# Patient Record
Sex: MALE | Race: WHITE | NOT HISPANIC OR LATINO | Employment: UNEMPLOYED | URBAN - METROPOLITAN AREA
[De-identification: names, ages, dates, MRNs, and addresses within clinical notes are randomized per-mention and may not be internally consistent; named-entity substitution may affect disease eponyms.]

---

## 2025-02-12 ENCOUNTER — APPOINTMENT (OUTPATIENT)
Dept: CARDIOLOGY | Facility: HOSPITAL | Age: 60
End: 2025-02-12
Payer: COMMERCIAL

## 2025-02-12 ENCOUNTER — APPOINTMENT (OUTPATIENT)
Dept: RADIOLOGY | Facility: HOSPITAL | Age: 60
End: 2025-02-12
Payer: COMMERCIAL

## 2025-02-12 ENCOUNTER — HOSPITAL ENCOUNTER (INPATIENT)
Facility: HOSPITAL | Age: 60
End: 2025-02-12
Attending: EMERGENCY MEDICINE | Admitting: INTERNAL MEDICINE
Payer: COMMERCIAL

## 2025-02-12 DIAGNOSIS — Z51.5 ADMISSION FOR HOSPICE CARE: ICD-10-CM

## 2025-02-12 DIAGNOSIS — J18.9 PNEUMONIA OF BOTH LUNGS DUE TO INFECTIOUS ORGANISM, UNSPECIFIED PART OF LUNG: Primary | ICD-10-CM

## 2025-02-12 PROBLEM — R91.8 LUNG NODULES: Status: ACTIVE | Noted: 2019-08-20

## 2025-02-12 PROBLEM — I10 ESSENTIAL HYPERTENSION: Status: ACTIVE | Noted: 2020-12-05

## 2025-02-12 PROBLEM — E05.90 HYPERTHYROIDISM: Status: ACTIVE | Noted: 2018-03-17

## 2025-02-12 PROBLEM — F19.90 IVDU (INTRAVENOUS DRUG USER): Status: ACTIVE | Noted: 2019-08-20

## 2025-02-12 PROBLEM — R56.9 SEIZURE (MULTI): Status: ACTIVE | Noted: 2019-03-25

## 2025-02-12 PROBLEM — I50.22 CHRONIC HFREF (HEART FAILURE WITH REDUCED EJECTION FRACTION): Status: ACTIVE | Noted: 2022-09-14

## 2025-02-12 PROBLEM — F41.1 GAD (GENERALIZED ANXIETY DISORDER): Status: ACTIVE | Noted: 2022-09-26

## 2025-02-12 PROBLEM — C64.1 RENAL CELL CARCINOMA OF RIGHT KIDNEY (MULTI): Status: ACTIVE | Noted: 2019-08-27

## 2025-02-12 PROBLEM — I51.81 TAKOTSUBO CARDIOMYOPATHY: Status: ACTIVE | Noted: 2019-08-19

## 2025-02-12 PROBLEM — I25.10 CORONARY ARTERY DISEASE INVOLVING NATIVE CORONARY ARTERY OF NATIVE HEART WITHOUT ANGINA PECTORIS: Status: ACTIVE | Noted: 2020-12-05

## 2025-02-12 PROBLEM — F11.90 CHRONIC, CONTINUOUS USE OF OPIOIDS: Status: ACTIVE | Noted: 2022-09-26

## 2025-02-12 PROBLEM — J44.9 COPD, SEVERE (MULTI): Status: ACTIVE | Noted: 2018-08-27

## 2025-02-12 LAB
ALBUMIN SERPL BCP-MCNC: 3.4 G/DL (ref 3.4–5)
ALP SERPL-CCNC: 77 U/L (ref 33–120)
ALT SERPL W P-5'-P-CCNC: 6 U/L (ref 10–52)
ANION GAP BLDV CALCULATED.4IONS-SCNC: 5 MMOL/L (ref 10–25)
ANION GAP SERPL CALC-SCNC: 9 MMOL/L (ref 10–20)
APAP SERPL-MCNC: <10 UG/ML
AST SERPL W P-5'-P-CCNC: 9 U/L (ref 9–39)
ATRIAL RATE: 101 BPM
BASE EXCESS BLDV CALC-SCNC: 8.5 MMOL/L (ref -2–3)
BASOPHILS # BLD AUTO: 0.05 X10*3/UL (ref 0–0.1)
BASOPHILS NFR BLD AUTO: 0.6 %
BILIRUB SERPL-MCNC: 0.4 MG/DL (ref 0–1.2)
BODY TEMPERATURE: 37 DEGREES CELSIUS
BUN SERPL-MCNC: 9 MG/DL (ref 6–23)
CA-I BLDV-SCNC: 1.16 MMOL/L (ref 1.1–1.33)
CALCIUM SERPL-MCNC: 8.8 MG/DL (ref 8.6–10.3)
CHLORIDE BLDV-SCNC: 99 MMOL/L (ref 98–107)
CHLORIDE SERPL-SCNC: 97 MMOL/L (ref 98–107)
CO2 SERPL-SCNC: 36 MMOL/L (ref 21–32)
CREAT SERPL-MCNC: 0.62 MG/DL (ref 0.5–1.3)
EGFRCR SERPLBLD CKD-EPI 2021: >90 ML/MIN/1.73M*2
EOSINOPHIL # BLD AUTO: 0.23 X10*3/UL (ref 0–0.7)
EOSINOPHIL NFR BLD AUTO: 2.6 %
ERYTHROCYTE [DISTWIDTH] IN BLOOD BY AUTOMATED COUNT: 12.4 % (ref 11.5–14.5)
ETHANOL SERPL-MCNC: <10 MG/DL
FLUAV RNA RESP QL NAA+PROBE: NOT DETECTED
FLUBV RNA RESP QL NAA+PROBE: NOT DETECTED
GLUCOSE BLDV-MCNC: 89 MG/DL (ref 74–99)
GLUCOSE SERPL-MCNC: 85 MG/DL (ref 74–99)
HCO3 BLDV-SCNC: 35.5 MMOL/L (ref 22–26)
HCT VFR BLD AUTO: 39.7 % (ref 41–52)
HCT VFR BLD EST: 37 % (ref 41–52)
HGB BLD-MCNC: 12.5 G/DL (ref 13.5–17.5)
HGB BLDV-MCNC: 12.2 G/DL (ref 13.5–17.5)
IMM GRANULOCYTES # BLD AUTO: 0.03 X10*3/UL (ref 0–0.7)
IMM GRANULOCYTES NFR BLD AUTO: 0.3 % (ref 0–0.9)
INHALED O2 CONCENTRATION: 40 %
LACTATE BLDV-SCNC: 0.8 MMOL/L (ref 0.4–2)
LYMPHOCYTES # BLD AUTO: 2 X10*3/UL (ref 1.2–4.8)
LYMPHOCYTES NFR BLD AUTO: 22.8 %
MCH RBC QN AUTO: 27.7 PG (ref 26–34)
MCHC RBC AUTO-ENTMCNC: 31.5 G/DL (ref 32–36)
MCV RBC AUTO: 88 FL (ref 80–100)
MONOCYTES # BLD AUTO: 0.83 X10*3/UL (ref 0.1–1)
MONOCYTES NFR BLD AUTO: 9.5 %
NEUTROPHILS # BLD AUTO: 5.64 X10*3/UL (ref 1.2–7.7)
NEUTROPHILS NFR BLD AUTO: 64.2 %
NRBC BLD-RTO: 0 /100 WBCS (ref 0–0)
OXYHGB MFR BLDV: 73.4 % (ref 45–75)
P AXIS: 80 DEGREES
PCO2 BLDV: 60 MM HG (ref 41–51)
PH BLDV: 7.38 PH (ref 7.33–7.43)
PLATELET # BLD AUTO: 352 X10*3/UL (ref 150–450)
PO2 BLDV: 47 MM HG (ref 35–45)
POTASSIUM BLDV-SCNC: 4 MMOL/L (ref 3.5–5.3)
POTASSIUM SERPL-SCNC: 3.9 MMOL/L (ref 3.5–5.3)
PR INTERVAL: 151 MS
PROT SERPL-MCNC: 6.3 G/DL (ref 6.4–8.2)
Q ONSET: 249 MS
QRS COUNT: 17 BEATS
QRS DURATION: 78 MS
QT INTERVAL: 344 MS
QTC CALCULATION(BAZETT): 446 MS
QTC FREDERICIA: 409 MS
R AXIS: 63 DEGREES
RBC # BLD AUTO: 4.51 X10*6/UL (ref 4.5–5.9)
RSV RNA RESP QL NAA+PROBE: NOT DETECTED
SALICYLATES SERPL-MCNC: <3 MG/DL
SAO2 % BLDV: 75 % (ref 45–75)
SARS-COV-2 RNA RESP QL NAA+PROBE: NOT DETECTED
SODIUM BLDV-SCNC: 135 MMOL/L (ref 136–145)
SODIUM SERPL-SCNC: 138 MMOL/L (ref 136–145)
T AXIS: 73 DEGREES
T OFFSET: 421 MS
VENTRICULAR RATE: 101 BPM
WBC # BLD AUTO: 8.8 X10*3/UL (ref 4.4–11.3)

## 2025-02-12 PROCEDURE — 71045 X-RAY EXAM CHEST 1 VIEW: CPT | Performed by: RADIOLOGY

## 2025-02-12 PROCEDURE — 99285 EMERGENCY DEPT VISIT HI MDM: CPT | Mod: 25 | Performed by: EMERGENCY MEDICINE

## 2025-02-12 PROCEDURE — 2500000001 HC RX 250 WO HCPCS SELF ADMINISTERED DRUGS (ALT 637 FOR MEDICARE OP): Performed by: EMERGENCY MEDICINE

## 2025-02-12 PROCEDURE — 84132 ASSAY OF SERUM POTASSIUM: CPT | Performed by: EMERGENCY MEDICINE

## 2025-02-12 PROCEDURE — 93005 ELECTROCARDIOGRAM TRACING: CPT

## 2025-02-12 PROCEDURE — 74177 CT ABD & PELVIS W/CONTRAST: CPT

## 2025-02-12 PROCEDURE — 99223 1ST HOSP IP/OBS HIGH 75: CPT

## 2025-02-12 PROCEDURE — 2550000001 HC RX 255 CONTRASTS: Performed by: EMERGENCY MEDICINE

## 2025-02-12 PROCEDURE — 2500000002 HC RX 250 W HCPCS SELF ADMINISTERED DRUGS (ALT 637 FOR MEDICARE OP, ALT 636 FOR OP/ED): Performed by: EMERGENCY MEDICINE

## 2025-02-12 PROCEDURE — 96374 THER/PROPH/DIAG INJ IV PUSH: CPT

## 2025-02-12 PROCEDURE — 74177 CT ABD & PELVIS W/CONTRAST: CPT | Performed by: RADIOLOGY

## 2025-02-12 PROCEDURE — 36415 COLL VENOUS BLD VENIPUNCTURE: CPT | Performed by: EMERGENCY MEDICINE

## 2025-02-12 PROCEDURE — 71260 CT THORAX DX C+: CPT | Performed by: RADIOLOGY

## 2025-02-12 PROCEDURE — 87637 SARSCOV2&INF A&B&RSV AMP PRB: CPT | Performed by: EMERGENCY MEDICINE

## 2025-02-12 PROCEDURE — 85025 COMPLETE CBC W/AUTO DIFF WBC: CPT | Performed by: EMERGENCY MEDICINE

## 2025-02-12 PROCEDURE — 80143 DRUG ASSAY ACETAMINOPHEN: CPT | Performed by: EMERGENCY MEDICINE

## 2025-02-12 PROCEDURE — 96376 TX/PRO/DX INJ SAME DRUG ADON: CPT

## 2025-02-12 PROCEDURE — 2500000004 HC RX 250 GENERAL PHARMACY W/ HCPCS (ALT 636 FOR OP/ED): Performed by: STUDENT IN AN ORGANIZED HEALTH CARE EDUCATION/TRAINING PROGRAM

## 2025-02-12 PROCEDURE — 2500000004 HC RX 250 GENERAL PHARMACY W/ HCPCS (ALT 636 FOR OP/ED): Performed by: EMERGENCY MEDICINE

## 2025-02-12 PROCEDURE — 71045 X-RAY EXAM CHEST 1 VIEW: CPT

## 2025-02-12 RX ORDER — PANTOPRAZOLE SODIUM 40 MG/10ML
40 INJECTION, POWDER, LYOPHILIZED, FOR SOLUTION INTRAVENOUS
Status: DISCONTINUED | OUTPATIENT
Start: 2025-02-13 | End: 2025-02-18 | Stop reason: HOSPADM

## 2025-02-12 RX ORDER — ROFLUMILAST 500 UG/1
500 TABLET ORAL DAILY
COMMUNITY

## 2025-02-12 RX ORDER — AZITHROMYCIN 250 MG/1
500 TABLET, FILM COATED ORAL ONCE
Status: COMPLETED | OUTPATIENT
Start: 2025-02-12 | End: 2025-02-12

## 2025-02-12 RX ORDER — MORPHINE SULFATE 4 MG/ML
4 INJECTION INTRAVENOUS ONCE
Status: COMPLETED | OUTPATIENT
Start: 2025-02-12 | End: 2025-02-12

## 2025-02-12 RX ORDER — BUDESONIDE, GLYCOPYRROLATE, AND FORMOTEROL FUMARATE 160; 9; 4.8 UG/1; UG/1; UG/1
2 AEROSOL, METERED RESPIRATORY (INHALATION) 2 TIMES DAILY
COMMUNITY

## 2025-02-12 RX ORDER — METOPROLOL SUCCINATE 50 MG/1
50 TABLET, EXTENDED RELEASE ORAL DAILY
Status: ON HOLD | COMMUNITY
End: 2025-02-13 | Stop reason: ENTERED-IN-ERROR

## 2025-02-12 RX ORDER — BUDESONIDE AND FORMOTEROL FUMARATE DIHYDRATE 160; 4.5 UG/1; UG/1
2 AEROSOL RESPIRATORY (INHALATION)
COMMUNITY

## 2025-02-12 RX ORDER — FLUTICASONE FUROATE AND VILANTEROL 200; 25 UG/1; UG/1
1 POWDER RESPIRATORY (INHALATION)
Status: DISCONTINUED | OUTPATIENT
Start: 2025-02-13 | End: 2025-02-18 | Stop reason: HOSPADM

## 2025-02-12 RX ORDER — IPRATROPIUM BROMIDE AND ALBUTEROL SULFATE 2.5; .5 MG/3ML; MG/3ML
3 SOLUTION RESPIRATORY (INHALATION)
Status: DISCONTINUED | OUTPATIENT
Start: 2025-02-13 | End: 2025-02-13

## 2025-02-12 RX ORDER — PREDNISONE 20 MG/1
60 TABLET ORAL ONCE
Status: COMPLETED | OUTPATIENT
Start: 2025-02-12 | End: 2025-02-12

## 2025-02-12 RX ORDER — ESCITALOPRAM OXALATE 10 MG/1
20 TABLET ORAL DAILY
Status: DISCONTINUED | OUTPATIENT
Start: 2025-02-13 | End: 2025-02-18 | Stop reason: HOSPADM

## 2025-02-12 RX ORDER — ATORVASTATIN CALCIUM 80 MG/1
80 TABLET, FILM COATED ORAL
Status: ON HOLD | COMMUNITY
Start: 2023-05-31 | End: 2025-02-13 | Stop reason: ENTERED-IN-ERROR

## 2025-02-12 RX ORDER — ESCITALOPRAM OXALATE 20 MG/1
20 TABLET ORAL DAILY
COMMUNITY

## 2025-02-12 RX ORDER — METOPROLOL SUCCINATE 50 MG/1
50 TABLET, EXTENDED RELEASE ORAL DAILY
Status: DISCONTINUED | OUTPATIENT
Start: 2025-02-13 | End: 2025-02-18 | Stop reason: HOSPADM

## 2025-02-12 RX ORDER — ACETAMINOPHEN 325 MG/1
650 TABLET ORAL EVERY 4 HOURS PRN
Status: DISCONTINUED | OUTPATIENT
Start: 2025-02-12 | End: 2025-02-18 | Stop reason: HOSPADM

## 2025-02-12 RX ORDER — ONDANSETRON HYDROCHLORIDE 2 MG/ML
4 INJECTION, SOLUTION INTRAVENOUS EVERY 6 HOURS PRN
Status: DISCONTINUED | OUTPATIENT
Start: 2025-02-12 | End: 2025-02-18 | Stop reason: HOSPADM

## 2025-02-12 RX ORDER — CEFTRIAXONE 2 G/50ML
2 INJECTION, SOLUTION INTRAVENOUS EVERY 24 HOURS
Status: DISCONTINUED | OUTPATIENT
Start: 2025-02-12 | End: 2025-02-18 | Stop reason: HOSPADM

## 2025-02-12 RX ORDER — POLYETHYLENE GLYCOL 3350 17 G/17G
17 POWDER, FOR SOLUTION ORAL DAILY PRN
Status: DISCONTINUED | OUTPATIENT
Start: 2025-02-12 | End: 2025-02-18 | Stop reason: HOSPADM

## 2025-02-12 RX ORDER — TALC
3 POWDER (GRAM) TOPICAL NIGHTLY PRN
Status: DISCONTINUED | OUTPATIENT
Start: 2025-02-12 | End: 2025-02-18 | Stop reason: HOSPADM

## 2025-02-12 RX ORDER — ASPIRIN 81 MG/1
81 TABLET ORAL DAILY
Status: DISCONTINUED | OUTPATIENT
Start: 2025-02-13 | End: 2025-02-18 | Stop reason: HOSPADM

## 2025-02-12 RX ORDER — ASPIRIN 81 MG/1
1 TABLET ORAL DAILY
Status: ON HOLD | COMMUNITY
End: 2025-02-13 | Stop reason: ENTERED-IN-ERROR

## 2025-02-12 RX ORDER — PANTOPRAZOLE SODIUM 40 MG/1
40 TABLET, DELAYED RELEASE ORAL
Status: DISCONTINUED | OUTPATIENT
Start: 2025-02-13 | End: 2025-02-18 | Stop reason: HOSPADM

## 2025-02-12 RX ORDER — ATORVASTATIN CALCIUM 40 MG/1
80 TABLET, FILM COATED ORAL
Status: DISCONTINUED | OUTPATIENT
Start: 2025-02-13 | End: 2025-02-18 | Stop reason: HOSPADM

## 2025-02-12 RX ORDER — AMOXICILLIN AND CLAVULANATE POTASSIUM 875; 125 MG/1; MG/1
1 TABLET, FILM COATED ORAL EVERY 12 HOURS SCHEDULED
Status: DISCONTINUED | OUTPATIENT
Start: 2025-02-12 | End: 2025-02-12

## 2025-02-12 RX ORDER — ALBUTEROL SULFATE 90 UG/1
2 INHALANT RESPIRATORY (INHALATION) EVERY 6 HOURS PRN
COMMUNITY
Start: 2024-07-29

## 2025-02-12 RX ADMIN — AMOXICILLIN AND CLAVULANATE POTASSIUM 1 TABLET: 875; 125 TABLET, FILM COATED ORAL at 14:01

## 2025-02-12 RX ADMIN — AMOXICILLIN AND CLAVULANATE POTASSIUM 1 TABLET: 875; 125 TABLET, FILM COATED ORAL at 20:23

## 2025-02-12 RX ADMIN — AZITHROMYCIN DIHYDRATE 500 MG: 250 TABLET ORAL at 14:02

## 2025-02-12 RX ADMIN — IOHEXOL 75 ML: 350 INJECTION, SOLUTION INTRAVENOUS at 10:04

## 2025-02-12 RX ADMIN — PREDNISONE 60 MG: 20 TABLET ORAL at 14:03

## 2025-02-12 RX ADMIN — MORPHINE SULFATE 4 MG: 4 INJECTION, SOLUTION INTRAMUSCULAR; INTRAVENOUS at 17:49

## 2025-02-12 RX ADMIN — MORPHINE SULFATE 4 MG: 4 INJECTION, SOLUTION INTRAMUSCULAR; INTRAVENOUS at 22:09

## 2025-02-12 SDOH — HEALTH STABILITY: MENTAL HEALTH: FOR HIGH RISK PATIENTS: 1:1 PATIENT OBSERVER AT ALL TIMES;ALL INTERVENTIONS ABOVE, PLUS:

## 2025-02-12 SDOH — HEALTH STABILITY: MENTAL HEALTH: NEEDS EXPRESSED: DENIES

## 2025-02-12 SDOH — HEALTH STABILITY: MENTAL HEALTH
OTHER SUICIDE PRECAUTIONS INCLUDE: HOURLY BEHAVIORAL ASSESSMENT PERFORMED;PROVIDER NOTIFIED;PATIENT PLACED IN AN EASILY OBSERVABLE ROOM WITH DOOR/CURTAIN REMAINING OPEN

## 2025-02-12 SDOH — HEALTH STABILITY: MENTAL HEALTH: BEHAVIORAL HEALTH(WDL): EXCEPTIONS TO WDL

## 2025-02-12 SDOH — HEALTH STABILITY: MENTAL HEALTH: BEHAVIORS/MOOD: CALM;TEARFUL

## 2025-02-12 SDOH — HEALTH STABILITY: MENTAL HEALTH

## 2025-02-12 ASSESSMENT — ENCOUNTER SYMPTOMS
SLEEP DISTURBANCE: 1
NERVOUS/ANXIOUS: 1
HALLUCINATIONS: 1
WEAKNESS: 1
FATIGUE: 1
DIARRHEA: 0
CHOKING: 1

## 2025-02-12 ASSESSMENT — LIFESTYLE VARIABLES
EVER FELT BAD OR GUILTY ABOUT YOUR DRINKING: NO
HAVE YOU EVER FELT YOU SHOULD CUT DOWN ON YOUR DRINKING: NO
EVER HAD A DRINK FIRST THING IN THE MORNING TO STEADY YOUR NERVES TO GET RID OF A HANGOVER: NO
TOTAL SCORE: 0
HAVE PEOPLE ANNOYED YOU BY CRITICIZING YOUR DRINKING: NO

## 2025-02-12 ASSESSMENT — COLUMBIA-SUICIDE SEVERITY RATING SCALE - C-SSRS
5. HAVE YOU STARTED TO WORK OUT OR WORKED OUT THE DETAILS OF HOW TO KILL YOURSELF? DO YOU INTEND TO CARRY OUT THIS PLAN?: NO
5. HAVE YOU STARTED TO WORK OUT OR WORKED OUT THE DETAILS OF HOW TO KILL YOURSELF? DO YOU INTEND TO CARRY OUT THIS PLAN?: NO
1. SINCE LAST CONTACT, HAVE YOU WISHED YOU WERE DEAD OR WISHED YOU COULD GO TO SLEEP AND NOT WAKE UP?: YES
2. HAVE YOU ACTUALLY HAD ANY THOUGHTS OF KILLING YOURSELF?: YES
6. HAVE YOU EVER DONE ANYTHING, STARTED TO DO ANYTHING, OR PREPARED TO DO ANYTHING TO END YOUR LIFE?: NO
1. IN THE PAST MONTH, HAVE YOU WISHED YOU WERE DEAD OR WISHED YOU COULD GO TO SLEEP AND NOT WAKE UP?: YES
2. HAVE YOU ACTUALLY HAD ANY THOUGHTS OF KILLING YOURSELF?: YES
4. HAVE YOU HAD THESE THOUGHTS AND HAD SOME INTENTION OF ACTING ON THEM?: YES
6. HAVE YOU EVER DONE ANYTHING, STARTED TO DO ANYTHING, OR PREPARED TO DO ANYTHING TO END YOUR LIFE?: NO

## 2025-02-12 ASSESSMENT — PAIN SCALES - GENERAL
PAINLEVEL_OUTOF10: 5 - MODERATE PAIN
PAINLEVEL_OUTOF10: 8

## 2025-02-12 ASSESSMENT — PAIN - FUNCTIONAL ASSESSMENT
PAIN_FUNCTIONAL_ASSESSMENT: 0-10
PAIN_FUNCTIONAL_ASSESSMENT: 0-10

## 2025-02-12 ASSESSMENT — PAIN DESCRIPTION - PAIN TYPE: TYPE: ACUTE PAIN

## 2025-02-12 NOTE — CONSULTS
"Visit type: An interactive audio and video telecommunication system which permits real time communications between the patient (at the originating site) and provider (at the distant site) was utilized to provide this telehealth service.     Verbal consent was requested and obtained from Ino Pierce on this date, 02/12/25 for a telehealth visit.     HISTORY OF PRESENT ILLNESS:  Ino Pierce is a 60 y.o. male with a past psychiatric history of anxiety and medical history of stage 4 COPD and chronic HFrEF and RCC of right kidney who was brought to  [facility] on [facility]/12 for weakness, cough, and passive SI. EPAT was consulted on 2/12 for assessment.    On chart review:  Pt on 5L NC at home, has hypochloremia to 97, lung nodule seen on CXR,     Pt prescribed ativan 0.5mg PO qAM, q2PM as needed and 0.25mg PO at bedtime PRN for anxiety    Also on morphine CR 15mg [id number]     Per ED nurse triage note:  \"Pt here with generalized weakness and increased shortness of breath x 1 month. He is stating that he also has a recent cough like other family/friends he has been around. He is normally on 5L NC. Pt states he fell this morning due to his weakness but was able to get himself up. He is having some left side and abd pain that started yesterday.     When asking the suicide risk screening questions, pt appears to be high risk. He is stating that he \"is done\" and \"I have thought of plenty of ways to do it but don't have a specific plan\". Pt also stating that he is \"tired of fight for air and everyone taking away his medications when he finds something that works.\" Pt is referring to his palliative care doctor cutting down on his morphine and ativan. During thisi triage, pt is inquiring about hospice care. MD will be notified.\"    Per ED provider note:  \"Patient presents emergency department for weakness, cough, passive suicidality.  Patient was on palliative care through [facility] however states he was fired because he could not " "make it to the appointments.  States has been dealing with difficulty breathing for years.  EMS has concern for possible viral infection given other people in the home have viral sounding illnesses.  Patient may statements of not wanting to deal with his illness anymore to nursing staff.  To me patient denied active suicide ideation, plan of suicide.  He does tell me that he is interested in hospice care.  Patient is also endorsing some left-sided cramping abdominal pain.  Patient wears oxygen at home.    Patient presents with generalized weakness, cough some shortness of breath. Available chart reviewed. On initial assessment the patient was found non-toxic, no acute distress, slightly tachycardic and afebrile. Initial concern for suicide ideation, pneumonia, pneumothorax, MI, ACS, intra-abdominal process, cancer. Viral testing is negative. Metabolic panel shows hypochloremia of 97, normal kidney and liver function. Urine tox is negative. Venous blood gas shows chronic CO2 retention with normal pH. CBC shows no leukocytosis, anemia hemoglobin 12.5. Chest x-ray shows concern for lung nodule. CT imaging is already ordered. CT of the chest shows concern for bilateral pneumonia and bronchiolitis. Patient does have some proximal stenosis of the aorta and a small right liver lobe. Findings were discussed with the patient who did want treated for pneumonia. Patient started on outpatient Augmentin and azithromycin and started on steroids. Hospice was consulted. Given his concern for suicidality thought it best to have EPAT evaluate the patient. Plan for EPAT evaluation. Patient care signed out to oncoming provider pending EPAT and if cleared hopefully hospice.\"    On interview:    Patient states he cannot take this anymore and wishes for it all to be over. He states he has been feeling this way since his wife left him 3 months ago after she said she is \"tired of taking care of me\". He states he has been feeling more " "depressed after multiple recent losses of family members and his two dogs, a poodle and shitzu, within the last year. He also has been getting medically sicker which has discouraged him, and states he was fired from palliative care due to missing appointments, and is now seeking hospice care.    He denies a current suicidal plan, intent, HI, or paranoid delusions. He is alert and oriented to person, place, time, date and year (said 25,), and does state he sees his two dogs walking in his ED room and has seen them four times since they . He states he lives with his nephew since his wife left him. He has four children and 3 grandchildren. He states his reason for living is seeing his grandkids. He states he is Hoahaoism, and at home to pass the time he watches tv. He denies ever being admitted psychiatrically, attempting suicide in the past, previous SI, previous psych meds or therapist or outpatient psychiatric care. He worked previously as a , highest level of education is 7th grade. He states he is close with his children, and his nephew does help him, but feels he does not know who to turn to for support. He currently takes ativan for anxiety. He feels depressed, decreased energy, poor sleep, and is in consistent back pain. His appetite has been ok. When asked if he needed anything else, he stated \"to be six feet under\". He denies any firearm access or lethal weapon access to this writer.     Attempted to call [facility] with son Naga Pierce ([phone]), went to , left message.    PSYCHIATRIC REVIEW OF SYSTEMS  As per HPI    PSYCHIATRIC HISTORY  Prior diagnoses: anxiety  Prior hospitalizations: denied  History of self-harm/suicide attempts: denied  History of trauma/abuse/loss: denied  History of violence: denied    Current mental health provider: none  Current mental health agency: denied  Current : denied  Current outpatient treatment: denied  Guardian or payee: self    Current " psychiatric medications: ativan 0.5mg qAM + qNOON and 0.25mg PO at bedtime PRN anxiety  Past psychiatric medications: denied  Past psychiatric treatments: denied    Family psychiatric history: denied    SUBSTANCE USE HISTORY   He reports that he has been smoking cigarettes. He has never used smokeless tobacco. No history on file for alcohol use and drug use.    Tobacco: since he was 15, ppd, now every once in awhile  Alcohol: denied     - History of severe withdrawal: denied  Cannabis: denied  Other substances: used heroin for three years in youth      - Last use: over 10 years ago     - History of overdose: twice      - Longest period of sobriety: quit 10 years  Prior substance use disorder treatment: denied    SOCIAL HISTORY  Social History     Socioeconomic History    Marital status: Single   Tobacco Use    Smoking status: Every Day     Types: Cigarettes    Smokeless tobacco: Never     Social Drivers of Health     Transportation Needs: No Transportation Needs (12/5/2023)    Received from [facility]    PRAPARE - Transportation     Lack of Transportation (Medical): No     Lack of Transportation (Non-Medical): No   Intimate Partner Violence: Not At Risk (1/17/2024)    Received from [facility]    Humiliation, Afraid, Rape, and Kick questionnaire     Fear of Current or Ex-Partner: No     Emotionally Abused: No     Physically Abused: No     Sexually Abused: No   Housing Stability: Low Risk  (12/5/2023)    Received from [facility]    Housing Stability Vital Sign     Unable to Pay for Housing in the Last Year: No     Number of Places Lived in the Last Year: 1     Unstable Housing in the Last Year: No      Current living situation: lives with nephew  Current employment/source of income: disability  Current stressors: recent loses, current medical illness and transition to hospice potentially    Family: has siblings whom have passed  Education: 7th grade  History of learning difficulty: denied  Marital  "status/Children:  three months ago, have four children, 3 grand children   Social support: children, nephew  Rastafari/Spirituality: is Adventism  Legal history: denied   history: denied  Access to weapons: denied access    PAST MEDICAL HISTORY  Past Medical History:   Diagnosis Date    COPD (chronic obstructive pulmonary disease) (Multi)     Myocardial infarction (Multi)         PAST SURGICAL HISTORY  History reviewed. No pertinent surgical history.     FAMILY HISTORY  No family history on file.     ALLERGIES  Patient has no known allergies.    OARRS REVIEW  OARRS checked: checked on 2/12/2025  OARRS comments: score 530, frequent ativan 0.5mg, morphine 15mg CR and IR filled by Dr. Sebastian Hadley and Brian Greenberg    OBJECTIVE    VITALS      2/12/2025    10:42 AM 2/12/2025    11:30 AM 2/12/2025    12:30 PM 2/12/2025     1:30 PM 2/12/2025     3:00 PM 2/12/2025     4:30 PM 2/12/2025     5:00 PM   Vitals   Systolic 145 148 143 148 155 168 161   Diastolic 74 85 92 80 95 97 100   Heart Rate 88 89 85 85 94 89 100   Resp 24 16 20 16 20 20 16        MENTAL STATUS EXAM  Appearance: appears much older than stated age, cachectic, frail with graying beard and disheveled, NC in place, in hospital attire.  Attitude: calm, cooperative, and engaged in conversation  Behavior: good eye contact  Motor Activity: no PMR/PMA; gait not assessed  Speech: regular rate, rhythm, and tone; spontaneous, coherent  Mood: \"I can't take it anymore\"  Affect: blunted, dysthymic, mood congruent and appropriate  Thought Process: linear, logical, and goal-directed; no loose associations or gross thought disorganization  Thought Content: endorses passive suicidal ideation without a current plan or intent, though states it is the worst it has been, denies homicidal ideation; no delusions elicited  Thought Perception: does not endorse auditory hallucinations; states he sees his dogs walking in the room (has happened 4 times in the past) does " not appear to be responding to hallucinatory stimuli  Cognition: alert and oriented to person, place, time, and circumstance; no deficits in attention, concentration, or language noted; able to follow conversation and answer questions appropriately  Insight: limited  Judgement: limited    MEDICAL REVIEW OF SYSTEMS  Review of Systems   Constitutional:  Positive for fatigue.   Respiratory:  Positive for choking.    Gastrointestinal:  Negative for diarrhea.   Neurological:  Positive for weakness.   Psychiatric/Behavioral:  Positive for hallucinations and sleep disturbance. The patient is nervous/anxious.         HOME MEDICATIONS  Medication Documentation Review Audit    **Prior to Admission medications have not yet been reviewed**          CURRENT MEDICATIONS  Scheduled medications  amoxicillin-pot clavulanate, 1 tablet, oral, q12h DAVID  morphine, 4 mg, intravenous, Once        Continuous medications       PRN medications       LABS  Results for orders placed or performed during the hospital encounter of 02/12/25 (from the past 24 hours)   Electrocardiogram, 12-lead   Result Value Ref Range    Ventricular Rate 101 BPM    Atrial Rate 101 BPM    NM Interval 151 ms    QRS Duration 78 ms    QT Interval 344 ms    QTC Calculation(Bazett) 446 ms    P Axis 80 degrees    R Axis 63 degrees    T Axis 73 degrees    QRS Count 17 beats    Q Onset 249 ms    T Offset 421 ms    QTC Fredericia 409 ms   CBC and Auto Differential   Result Value Ref Range    WBC 8.8 4.4 - 11.3 x10*3/uL    nRBC 0.0 0.0 - 0.0 /100 WBCs    RBC 4.51 4.50 - 5.90 x10*6/uL    Hemoglobin 12.5 (L) 13.5 - 17.5 g/dL    Hematocrit 39.7 (L) 41.0 - 52.0 %    MCV 88 80 - 100 fL    MCH 27.7 26.0 - 34.0 pg    MCHC 31.5 (L) 32.0 - 36.0 g/dL    RDW 12.4 11.5 - 14.5 %    Platelets 352 150 - 450 x10*3/uL    Neutrophils % 64.2 40.0 - 80.0 %    Immature Granulocytes %, Automated 0.3 0.0 - 0.9 %    Lymphocytes % 22.8 13.0 - 44.0 %    Monocytes % 9.5 2.0 - 10.0 %    Eosinophils %  2.6 0.0 - 6.0 %    Basophils % 0.6 0.0 - 2.0 %    Neutrophils Absolute 5.64 1.20 - 7.70 x10*3/uL    Immature Granulocytes Absolute, Automated 0.03 0.00 - 0.70 x10*3/uL    Lymphocytes Absolute 2.00 1.20 - 4.80 x10*3/uL    Monocytes Absolute 0.83 0.10 - 1.00 x10*3/uL    Eosinophils Absolute 0.23 0.00 - 0.70 x10*3/uL    Basophils Absolute 0.05 0.00 - 0.10 x10*3/uL   Comprehensive Metabolic Panel   Result Value Ref Range    Glucose 85 74 - 99 mg/dL    Sodium 138 136 - 145 mmol/L    Potassium 3.9 3.5 - 5.3 mmol/L    Chloride 97 (L) 98 - 107 mmol/L    Bicarbonate 36 (H) 21 - 32 mmol/L    Anion Gap 9 (L) 10 - 20 mmol/L    Urea Nitrogen 9 6 - 23 mg/dL    Creatinine 0.62 0.50 - 1.30 mg/dL    eGFR >90 >60 mL/min/1.73m*2    Calcium 8.8 8.6 - 10.3 mg/dL    Albumin 3.4 3.4 - 5.0 g/dL    Alkaline Phosphatase 77 33 - 120 U/L    Total Protein 6.3 (L) 6.4 - 8.2 g/dL    AST 9 9 - 39 U/L    Bilirubin, Total 0.4 0.0 - 1.2 mg/dL    ALT 6 (L) 10 - 52 U/L   Acute Toxicology Panel, Blood   Result Value Ref Range    Acetaminophen <10.0 10.0 - 30.0 ug/mL    Salicylate  <3 4 - 20 mg/dL    Alcohol <10 <=10 mg/dL   Sars-CoV-2 and Influenza A/B PCR   Result Value Ref Range    Flu A Result Not Detected Not Detected    Flu B Result Not Detected Not Detected    Coronavirus 2019, PCR Not Detected Not Detected   RSV PCR   Result Value Ref Range    RSV PCR Not Detected Not Detected   BLOOD GAS VENOUS FULL PANEL   Result Value Ref Range    POCT pH, Venous 7.38 7.33 - 7.43 pH    POCT pCO2, Venous 60 (H) 41 - 51 mm Hg    POCT pO2, Venous 47 (H) 35 - 45 mm Hg    POCT SO2, Venous 75 45 - 75 %    POCT Oxy Hemoglobin, Venous 73.4 45.0 - 75.0 %    POCT Hematocrit Calculated, Venous 37.0 (L) 41.0 - 52.0 %    POCT Sodium, Venous 135 (L) 136 - 145 mmol/L    POCT Potassium, Venous 4.0 3.5 - 5.3 mmol/L    POCT Chloride, Venous 99 98 - 107 mmol/L    POCT Ionized Calicum, Venous 1.16 1.10 - 1.33 mmol/L    POCT Glucose, Venous 89 74 - 99 mg/dL    POCT Lactate, Venous  0.8 0.4 - 2.0 mmol/L    POCT Base Excess, Venous 8.5 (H) -2.0 - 3.0 mmol/L    POCT HCO3 Calculated, Venous 35.5 (H) 22.0 - 26.0 mmol/L    POCT Hemoglobin, Venous 12.2 (L) 13.5 - 17.5 g/dL    POCT Anion Gap, Venous 5.0 (L) 10.0 - 25.0 mmol/L    Patient Temperature 37.0 degrees Celsius    FiO2 40 %        IMAGING  CT chest abdomen pelvis w IV contrast    Result Date: 2/12/2025  Interpreted By:  Tyshawn Barahona, STUDY: CT CHEST ABDOMEN PELVIS W IV CONTRAST;  2/12/2025 10:19 am   INDICATION: 59 y/o   M with  Signs/Symptoms:left abd pain, sob.     LIMITATIONS: None..   ACCESSION NUMBER(S): [id number]   ORDERING CLINICIAN: ELIZABETH ARMENDARIZ   TECHNIQUE: After the administration of IV nonionic contrast, images were obtained from the thoracic inlet down through the symphysis pubis. Sagittal and coronal reconstruction images were generated. Mediastinal, lung, bone, and liver windows were reviewed. Omnipaque 350   75 ML   COMPARISON: CT chest with contrast dated 03/18/2022.   FINDINGS: CHEST FINDINGS:   CHEST WALL/BASE OF THE NECK: The chest wall was grossly intact. No thyromegaly or thyroid mass.   LUNGS/ PLEURA/ AND TRACHEA: No pleural effusion. No pneumothorax. Mild secretions along the posterior wall of the distal trachea.. There are very mild secretions along the posterior walls of each main bronchus. There is bilateral bronchial wall thickening, most pronounced in the right middle lobe and left lingula and the lower lobes. There are some secretions in several segmental and subsegmental bronchi in the lower lobes, right greater than left. There are infiltrative densities and also reticulonodular densities all all posteriorly in both lower lobes consistent with pneumonia, right greater than left. There are emphysematous changes in the lungs. There are several scattered small nodules peripherally in the lateral and posterior right upper lobe, for example on axial image 98. These were not present previously. There are multiple tiny  nodules laterally in the inferior aspect of the right upper lobe centered on axial image 184, not evident previously. There are also multiple tiny peripheral nodules laterally in the right middle lobe, for example on image 223, not present previously. There are multiple new tiny nodules and reticulonodular densities in the superior segment of the right lower lobe. There are new tiny nodules and reticulonodular densities in the left lingula.   MEDIASTINUM/ARMIDA: Development of mild mediastinal adenopathy with lymph nodes measuring 10 mm short axis or less. Very mild bilateral hilar adenopathy. No axillary adenopathy. No cardiomegaly. No pericardial effusion. There was no aneurysmal dilatation or dissection of the thoracic aorta.   BONES: No destructive lytic or blastic bone lesion.     ABDOMEN/PELVIS FINDINGS:   LIVER: No hepatomegaly. Liver density was  within the limits of normal. There is a 6 mm cyst in segment 6 of the liver on image 122. Normal portal vein enhancement..   GALLBLADDER: No calcified stone, gallbladder wall thickening, or adjacent edema.   BILE DUCTS: No intrahepatic biliary ductal dilatation. Common bile duct was within the limits of normal.   SPLEEN: No splenomegaly. No splenic  mass.   PANCREAS: No pancreatic mass or inflammation, or ductal dilatation.   KIDNEYS/ADRENALS: No adrenal mass or enlargement. No calcified stone, hydronephrosis, mass, or perinephric edema in either kidney. No ureteral stone or dilatation.   BLADDER/PELVIS: Urinary bladder was grossly intact. No prostate enlargement.   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 Moderate aortoiliac calcifications. Otherwise, abdominal aorta and IVC were grossly intact. No suspicious retroperitoneal adenopathy. No suspicious mesenteric adenopathy. No suspicious pelvic or inguinal adenopathy.   PERITONEUM: No ascites. No  pneumoperitoneum. No peritoneal or mesenteric mass or inflammation.   BOWEL: The stomach was  grossly intact. There was no small bowel dilatation or small bowel wall thickening. No small-bowel obstruction. Mild scattered retained colonic stool and gas. There was no colonic wall thickening or large bowel obstruction. No edema adjacent to the colon. The cecal appendix was surgically absent..   BONES: No destructive lytic or blastic bone lesion. Bilateral L4 pars defects. Grade 1 anterolisthesis of L4 over L5. Mild-to-moderate disc space narrowing with endplate spurring at that level. Inter facet hypertrophic arthritic changes at L5-S1 with mild disc space narrowing at that level. There is mild endplate osteophytosis at L1-2 and L2-3. Slight disc space narrowing at L1-2. Incidental note of partial sacralization of the left L5 transverse process. Mild sclerotic arthritic changes in both SI joints.   ABDOMINAL WALL: Unremarkable.       CHEST: Mild new mediastinal adenopathy and very mild bilateral hilar adenopathy, most likely infectious/inflammatory.   Mild secretions in the trachea and main bronchi as described, in addition to secretions evident in several segmental/subsegmental bronchi in the lower lobes. In addition, there is bronchial wall thickening in both lower lobes and to a lesser extent the right middle lobe and left lingula consistent with acute bronchitis. Finally, there are areas of reticulonodular and more confluent infiltrative density in the lower lobes as described, right greater than left, consistent with pneumonia. There are also scattered new areas of tiny nodules and reticulonodular densities in the right upper lobe and right middle lobe, superior segment of the right lower lobe, and in the left lingula consistent with acute bronchiolitis.   Underlying emphysema.     ABDOMEN/PELVIS: Arthritic changes in the lumbosacral spine as described. No CT evidence of fracture of the lumbar spine or pelvis in  this exam.   Aortoiliac calcifications. 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 Please see above for details.   Small right lobe liver cyst.   Previous appendectomy.   MACRO: None   Signed by: Tyshawn Barahona 2/12/2025 10:46 AM Dictation workstation:   [id number]    Electrocardiogram, 12-lead    Result Date: 2/12/2025  Sinus tachycardia Biatrial enlargement ST elevation, consider inferior injury See ED provider note for full interpretation and clinical correlation Confirmed by Sandy Valles ([id number]) on 2/12/2025 9:56:47 AM    XR chest 1 view    Result Date: 2/12/2025  Interpreted By:  Yan Almaraz, STUDY: XR CHEST 1 VIEW   INDICATION: Signs/Symptoms:sob.   COMPARISON: March 18, 2022   ACCESSION NUMBER(S): [id number]   ORDERING CLINICIAN: ELIZABETH ARMENDARIZ   FINDINGS: 1.2 cm suspected right lower lobe pulmonary nodule new from prior study. This raises concern for possible neoplasm.   COPD changes have worsened in the interval with more flattening of the diaphragms and more coarse interstitial markings.   No consolidation, effusion, edema, pneumothorax.       New 1.2 cm pulmonary nodule raising concern for potential neoplasm. CT of the thorax recommended for further evaluation.   Worsening COPD.   MACRO: Critical Finding:  See findings. Notification was initiated on 2/12/2025 at 9:45 am by  Yan Almaraz.  (**-YCF-**) Instructions:   Signed by: Yan Almaraz 2/12/2025 9:47 AM Dictation workstation:   [id number]      PSYCHIATRIC RISK ASSESSMENT  Violence Risk Factors:  male, current psychiatric illness, lack of insight, and stress/destabilizers  Acute Risk of Harm to Others is Considered: Low  Suicide Risk Factors: male, ; /[place] native, chronic medical illness, current psychiatric illness, recent loss or impending loss of loved one, failed relationship the last year, life crisis (shame/despair), feelings of hopelessness, global insomnia, anxious ruminations, and decreased  "self-esteem  Protective Factors: Synagogue affiliation/spirituality and positive family relationships  Acute Risk of Harm to Self is Considered: Low    ASSESSMENT AND PLAN  Ino Pierce is a 60 y.o. male with a past psychiatric history of anxiety and medical history of stage 4 COPD and chronic HFrEF and RCC of right kidney who was brought [facility]12 for weakness, cough, and passive SI. EPAT was consulted on 2/12 for assessment.    On initial assessment, patient presents with depressed mood and stated anxiety, along with passive suicidal ideation, \"wishing it to all be over\", without any active plan or intent for suicide, stating his grandchildren as significant reasons for living. He denies any access to lethal means. Patient demonstrates demoralization and bereavement, both on recent losses and his current medical situation, and though he has these symptoms, he continued to refuse active suicidal ideation with intent or a plan, and would benefit from assessment from hospice for further care given his hx with palliation and inquiry. Given the above, at this time he does not meet inpatient psychiatric criteria, and the least restrictive means for continued safety, stabilization, and treatment is within the outpatient setting. Psychiatry will continue to follow if medically admitted for continued assessment and therapeutic intervention.      DIAGNOSIS  Adjustment Disorder  Bereavement  R/o MDD    RECOMMENDATIONS  - Patient does not  currently meet criteria for inpatient psychiatric admission.    - Patient lacks the capacity to leave AMA at this time and thus cannot leave AMA. Call [facility] VIOLET if patient attempts to elope.  - To evaluate decision-making capacity, recommend use of the Capacity Evaluation Tool. Search “ IP Capacity Evaluation\" under SmartText unless the patient has a legal guardian, in which case all decisions per the legal guardian.  - Call [facility]iolets as needed for agitated, " threatening, and non-redirectable behaviors.  - Patient does not require a 1:1 sitter while in the emergency department at this time.   - Patient should be in hospital attire. Please remove/secure personal belongings from the room.    ==========  - Discussed recommendations with ED provider.  - Psychiatry will continue to follow if medically admitted    Patient staffed with supervising attending Dr. Ontiveros, who agrees with above plan.    Adin Ram MD     Medication Consent  Medication Consent: n/a; consult service

## 2025-02-12 NOTE — ED PROVIDER NOTES
HPI   Chief Complaint   Patient presents with    Weakness, Gen    Cough    Suicidal       Patient presents emergency department for weakness, cough, passive suicidality.  Patient was on palliative care through [facility] however states he was fired because he could not make it to the appointments.  States has been dealing with difficulty breathing for years.  EMS has concern for possible viral infection given other people in the home have viral sounding illnesses.  Patient may statements of not wanting to deal with his illness anymore to nursing staff.  To me patient denied active suicide ideation, plan of suicide.  He does tell me that he is interested in hospice care.  Patient is also endorsing some left-sided cramping abdominal pain.  Patient wears oxygen at home.              Patient History   Past Medical History:   Diagnosis Date    COPD (chronic obstructive pulmonary disease) (Multi)     Myocardial infarction (Multi)      History reviewed. No pertinent surgical history.  No family history on file.  Social History     Tobacco Use    Smoking status: Every Day     Types: Cigarettes    Smokeless tobacco: Never   Substance Use Topics    Alcohol use: Not on file    Drug use: Not on file       Physical Exam   ED Triage Vitals   Temperature Heart Rate Respirations BP   02/12/25 0832 02/12/25 0832 02/12/25 0832 02/12/25 0844   36.8 °C (98.3 °F) (!) 105 20 (!) 144/91      Pulse Ox Temp src Heart Rate Source Patient Position   02/12/25 0832 -- 02/12/25 0832 02/12/25 1042   99 %  Monitor Lying      BP Location FiO2 (%)     -- --             Physical Exam  Vitals and nursing note reviewed.   Constitutional:       General: He is not in acute distress.     Appearance: He is well-developed.   HENT:      Head: Normocephalic and atraumatic.      Right Ear: External ear normal.      Left Ear: External ear normal.      Mouth/Throat:      Mouth: Mucous membranes are moist.      Pharynx: Oropharynx is clear.   Eyes:      Extraocular  Movements: Extraocular movements intact.      Conjunctiva/sclera: Conjunctivae normal.   Neck:      Trachea: Trachea normal.   Cardiovascular:      Rate and Rhythm: Tachycardia present.   Pulmonary:      Effort: Pulmonary effort is normal. No respiratory distress.      Breath sounds: Rhonchi present.   Abdominal:      General: Bowel sounds are normal.      Palpations: Abdomen is soft.      Tenderness: There is no abdominal tenderness.   Musculoskeletal:         General: No swelling or tenderness.      Cervical back: No tenderness.   Skin:     General: Skin is warm and dry.      Capillary Refill: Capillary refill takes less than 2 seconds.   Neurological:      General: No focal deficit present.      Mental Status: He is alert and oriented to person, place, and time.   Psychiatric:         Mood and Affect: Mood normal.         Thought Content: Thought content includes suicidal (Passive) ideation. Thought content does not include homicidal ideation.           ED Course & MDM   Diagnoses as of 02/12/25 1702   Pneumonia of both lungs due to infectious organism, unspecified part of lung                 No data recorded     Miles Coma Scale Score: 15 (02/12/25 0843 : Halley Bond RN)                           Medical Decision Making  Patient presents with generalized weakness, cough some shortness of breath. Available chart reviewed. On initial assessment the patient was found non-toxic, no acute distress, slightly tachycardic and afebrile. Initial concern for suicide ideation, pneumonia, pneumothorax, MI, ACS, intra-abdominal process, cancer.  Viral testing is negative.  Metabolic panel shows hypochloremia of 97, normal kidney and liver function.  Urine tox is negative.  Venous blood gas shows chronic CO2 retention with normal pH.  CBC shows no leukocytosis, anemia hemoglobin 12.5.  Chest x-ray shows concern for lung nodule.  CT imaging is already ordered.  CT of the chest shows concern for bilateral pneumonia and  bronchiolitis.  Patient does have some proximal stenosis of the aorta and a small right liver lobe.  Findings were discussed with the patient who did want treated for pneumonia.  Patient started on outpatient Augmentin and azithromycin and started on steroids.  Hospice was consulted.  Given his concern for suicidality thought it best to have EPAT evaluate the patient.  Plan for EPAT evaluation.  Patient care signed out to oncoming provider pending EPAT and if cleared hopefully hospice.        Procedure  Procedures     Benjamin Tovar MD  02/12/25 0213

## 2025-02-12 NOTE — ED NOTES
Dr Tovar assessed and spoke with pt and determined no sitter needed at this time. I informed sitter who was at bedside. Pt resting with eyes closed and even and unlabored resps. Call bell in reach. Remains on cardiac monitor.      Halley Bond RN  02/12/25 3156

## 2025-02-12 NOTE — ED TRIAGE NOTES
"Pt here with generalized weakness and increased shortness of breath x 1 month. He is stating that he also has a recent cough like other family/friends he has been around. He is normally on 5L NC. Pt states he fell this morning due to his weakness but was able to get himself up. He is having some left side and abd pain that started yesterday.    When asking the suicide risk screening questions, pt appears to be high risk. He is stating that he \"is done\" and \"I have thought of plenty of ways to do it but don't have a specific plan\". Pt also stating that he is \"tired of fight for air and everyone taking away his medications when he finds something that works.\" Pt is referring to his palliative care doctor cutting down on his morphine and ativan. During thisi triage, pt is inquiring about hospice care. MD will be notified.   "

## 2025-02-13 LAB
ALBUMIN SERPL BCP-MCNC: 3.6 G/DL (ref 3.4–5)
ALP SERPL-CCNC: 81 U/L (ref 33–120)
ALT SERPL W P-5'-P-CCNC: 7 U/L (ref 10–52)
AMPHETAMINES UR QL SCN: ABNORMAL
ANION GAP SERPL CALC-SCNC: 12 MMOL/L (ref 10–20)
AST SERPL W P-5'-P-CCNC: 9 U/L (ref 9–39)
BARBITURATES UR QL SCN: ABNORMAL
BENZODIAZ UR QL SCN: ABNORMAL
BILIRUB SERPL-MCNC: 0.4 MG/DL (ref 0–1.2)
BUN SERPL-MCNC: 21 MG/DL (ref 6–23)
BZE UR QL SCN: ABNORMAL
CALCIUM SERPL-MCNC: 9.1 MG/DL (ref 8.6–10.3)
CANNABINOIDS UR QL SCN: ABNORMAL
CHLORIDE SERPL-SCNC: 96 MMOL/L (ref 98–107)
CO2 SERPL-SCNC: 29 MMOL/L (ref 21–32)
CREAT SERPL-MCNC: 0.74 MG/DL (ref 0.5–1.3)
EGFRCR SERPLBLD CKD-EPI 2021: >90 ML/MIN/1.73M*2
ERYTHROCYTE [DISTWIDTH] IN BLOOD BY AUTOMATED COUNT: 12.2 % (ref 11.5–14.5)
FENTANYL+NORFENTANYL UR QL SCN: ABNORMAL
GLUCOSE SERPL-MCNC: 190 MG/DL (ref 74–99)
HCT VFR BLD AUTO: 39.5 % (ref 41–52)
HGB BLD-MCNC: 12.7 G/DL (ref 13.5–17.5)
LEGIONELLA AG UR QL: NEGATIVE
MCH RBC QN AUTO: 27.5 PG (ref 26–34)
MCHC RBC AUTO-ENTMCNC: 32.2 G/DL (ref 32–36)
MCV RBC AUTO: 86 FL (ref 80–100)
METHADONE UR QL SCN: ABNORMAL
NRBC BLD-RTO: 0 /100 WBCS (ref 0–0)
OPIATES UR QL SCN: ABNORMAL
OXYCODONE+OXYMORPHONE UR QL SCN: ABNORMAL
PCP UR QL SCN: ABNORMAL
PLATELET # BLD AUTO: 516 X10*3/UL (ref 150–450)
POTASSIUM SERPL-SCNC: 4.3 MMOL/L (ref 3.5–5.3)
PROT SERPL-MCNC: 6.4 G/DL (ref 6.4–8.2)
RBC # BLD AUTO: 4.62 X10*6/UL (ref 4.5–5.9)
S PNEUM AG UR QL: NEGATIVE
SODIUM SERPL-SCNC: 133 MMOL/L (ref 136–145)
WBC # BLD AUTO: 10 X10*3/UL (ref 4.4–11.3)

## 2025-02-13 PROCEDURE — 85027 COMPLETE CBC AUTOMATED: CPT

## 2025-02-13 PROCEDURE — 94640 AIRWAY INHALATION TREATMENT: CPT

## 2025-02-13 PROCEDURE — 2500000001 HC RX 250 WO HCPCS SELF ADMINISTERED DRUGS (ALT 637 FOR MEDICARE OP): Performed by: INTERNAL MEDICINE

## 2025-02-13 PROCEDURE — 2500000001 HC RX 250 WO HCPCS SELF ADMINISTERED DRUGS (ALT 637 FOR MEDICARE OP)

## 2025-02-13 PROCEDURE — 80053 COMPREHEN METABOLIC PANEL: CPT

## 2025-02-13 PROCEDURE — 80307 DRUG TEST PRSMV CHEM ANLYZR: CPT | Performed by: EMERGENCY MEDICINE

## 2025-02-13 PROCEDURE — 94668 MNPJ CHEST WALL SBSQ: CPT

## 2025-02-13 PROCEDURE — 94669 MECHANICAL CHEST WALL OSCILL: CPT

## 2025-02-13 PROCEDURE — 99233 SBSQ HOSP IP/OBS HIGH 50: CPT | Performed by: INTERNAL MEDICINE

## 2025-02-13 PROCEDURE — 1200000002 HC GENERAL ROOM WITH TELEMETRY DAILY

## 2025-02-13 PROCEDURE — 87449 NOS EACH ORGANISM AG IA: CPT | Mod: PORLAB

## 2025-02-13 PROCEDURE — 2500000001 HC RX 250 WO HCPCS SELF ADMINISTERED DRUGS (ALT 637 FOR MEDICARE OP): Performed by: REGISTERED NURSE

## 2025-02-13 PROCEDURE — 36415 COLL VENOUS BLD VENIPUNCTURE: CPT

## 2025-02-13 PROCEDURE — 87899 AGENT NOS ASSAY W/OPTIC: CPT | Mod: PORLAB

## 2025-02-13 PROCEDURE — 2500000004 HC RX 250 GENERAL PHARMACY W/ HCPCS (ALT 636 FOR OP/ED)

## 2025-02-13 PROCEDURE — 2500000002 HC RX 250 W HCPCS SELF ADMINISTERED DRUGS (ALT 637 FOR MEDICARE OP, ALT 636 FOR OP/ED)

## 2025-02-13 RX ORDER — AMOXICILLIN 250 MG
1 CAPSULE ORAL DAILY PRN
COMMUNITY

## 2025-02-13 RX ORDER — LORAZEPAM 0.5 MG/1
0.5 TABLET ORAL EVERY 8 HOURS PRN
Status: DISCONTINUED | OUTPATIENT
Start: 2025-02-13 | End: 2025-02-18 | Stop reason: HOSPADM

## 2025-02-13 RX ORDER — IPRATROPIUM BROMIDE AND ALBUTEROL SULFATE 2.5; .5 MG/3ML; MG/3ML
3 SOLUTION RESPIRATORY (INHALATION)
Status: DISCONTINUED | OUTPATIENT
Start: 2025-02-13 | End: 2025-02-18 | Stop reason: HOSPADM

## 2025-02-13 RX ORDER — MORPHINE SULFATE 15 MG/1
15 TABLET ORAL EVERY 6 HOURS PRN
Status: DISCONTINUED | OUTPATIENT
Start: 2025-02-13 | End: 2025-02-18 | Stop reason: HOSPADM

## 2025-02-13 RX ORDER — IPRATROPIUM BROMIDE AND ALBUTEROL SULFATE 2.5; .5 MG/3ML; MG/3ML
3 SOLUTION RESPIRATORY (INHALATION) EVERY 4 HOURS PRN
Status: DISCONTINUED | OUTPATIENT
Start: 2025-02-13 | End: 2025-02-18 | Stop reason: HOSPADM

## 2025-02-13 RX ORDER — MORPHINE SULFATE 15 MG/1
15 TABLET, FILM COATED, EXTENDED RELEASE ORAL EVERY 12 HOURS PRN
Status: DISCONTINUED | OUTPATIENT
Start: 2025-02-13 | End: 2025-02-13

## 2025-02-13 RX ORDER — LORAZEPAM 0.5 MG/1
0.5 TABLET ORAL 2 TIMES DAILY
COMMUNITY

## 2025-02-13 RX ORDER — MORPHINE SULFATE 15 MG/1
15 TABLET ORAL EVERY 6 HOURS PRN
COMMUNITY

## 2025-02-13 RX ADMIN — IPRATROPIUM BROMIDE AND ALBUTEROL SULFATE 3 ML: 2.5; .5 SOLUTION RESPIRATORY (INHALATION) at 07:42

## 2025-02-13 RX ADMIN — LORAZEPAM 0.5 MG: 0.5 TABLET ORAL at 01:57

## 2025-02-13 RX ADMIN — CEFTRIAXONE SODIUM 2 G: 2 INJECTION, SOLUTION INTRAVENOUS at 03:17

## 2025-02-13 RX ADMIN — LORAZEPAM 0.5 MG: 0.5 TABLET ORAL at 22:06

## 2025-02-13 RX ADMIN — ESCITALOPRAM OXALATE 20 MG: 10 TABLET ORAL at 08:11

## 2025-02-13 RX ADMIN — FLUTICASONE FUROATE AND VILANTEROL TRIFENATATE 1 PUFF: 200; 25 POWDER RESPIRATORY (INHALATION) at 08:11

## 2025-02-13 RX ADMIN — MORPHINE SULFATE 15 MG: 15 TABLET, EXTENDED RELEASE ORAL at 13:49

## 2025-02-13 RX ADMIN — ASPIRIN 81 MG: 81 TABLET, COATED ORAL at 08:11

## 2025-02-13 RX ADMIN — PANTOPRAZOLE SODIUM 40 MG: 40 TABLET, DELAYED RELEASE ORAL at 06:23

## 2025-02-13 RX ADMIN — MORPHINE SULFATE 15 MG: 15 TABLET ORAL at 22:06

## 2025-02-13 RX ADMIN — CEFTRIAXONE SODIUM 2 G: 2 INJECTION, SOLUTION INTRAVENOUS at 23:45

## 2025-02-13 RX ADMIN — METOPROLOL SUCCINATE 50 MG: 50 TABLET, EXTENDED RELEASE ORAL at 08:11

## 2025-02-13 RX ADMIN — TIOTROPIUM BROMIDE INHALATION SPRAY 2 PUFF: 3.12 SPRAY, METERED RESPIRATORY (INHALATION) at 08:12

## 2025-02-13 RX ADMIN — IPRATROPIUM BROMIDE AND ALBUTEROL SULFATE 3 ML: 2.5; .5 SOLUTION RESPIRATORY (INHALATION) at 21:13

## 2025-02-13 RX ADMIN — ATORVASTATIN CALCIUM 80 MG: 40 TABLET, FILM COATED ORAL at 08:11

## 2025-02-13 RX ADMIN — IPRATROPIUM BROMIDE AND ALBUTEROL SULFATE 3 ML: 2.5; .5 SOLUTION RESPIRATORY (INHALATION) at 00:42

## 2025-02-13 RX ADMIN — AZITHROMYCIN MONOHYDRATE 500 MG: 500 INJECTION, POWDER, LYOPHILIZED, FOR SOLUTION INTRAVENOUS at 10:16

## 2025-02-13 RX ADMIN — IPRATROPIUM BROMIDE AND ALBUTEROL SULFATE 3 ML: 2.5; .5 SOLUTION RESPIRATORY (INHALATION) at 15:45

## 2025-02-13 RX ADMIN — MORPHINE SULFATE 15 MG: 15 TABLET, EXTENDED RELEASE ORAL at 01:57

## 2025-02-13 SDOH — SOCIAL STABILITY: SOCIAL INSECURITY: ABUSE: ADULT

## 2025-02-13 SDOH — ECONOMIC STABILITY: TRANSPORTATION INSECURITY: IN THE PAST 12 MONTHS, HAS LACK OF TRANSPORTATION KEPT YOU FROM MEDICAL APPOINTMENTS OR FROM GETTING MEDICATIONS?: YES

## 2025-02-13 SDOH — SOCIAL STABILITY: SOCIAL INSECURITY: WERE YOU ABLE TO COMPLETE ALL THE BEHAVIORAL HEALTH SCREENINGS?: YES

## 2025-02-13 SDOH — ECONOMIC STABILITY: INCOME INSECURITY: IN THE PAST 12 MONTHS HAS THE ELECTRIC, GAS, OIL, OR WATER COMPANY THREATENED TO SHUT OFF SERVICES IN YOUR HOME?: NO

## 2025-02-13 SDOH — SOCIAL STABILITY: SOCIAL INSECURITY
WITHIN THE LAST YEAR, HAVE YOU BEEN RAPED OR FORCED TO HAVE ANY KIND OF SEXUAL ACTIVITY BY YOUR PARTNER OR EX-PARTNER?: NO

## 2025-02-13 SDOH — ECONOMIC STABILITY: HOUSING INSECURITY: AT ANY TIME IN THE PAST 12 MONTHS, WERE YOU HOMELESS OR LIVING IN A SHELTER (INCLUDING NOW)?: NO

## 2025-02-13 SDOH — SOCIAL STABILITY: SOCIAL INSECURITY
WITHIN THE LAST YEAR, HAVE YOU BEEN KICKED, HIT, SLAPPED, OR OTHERWISE PHYSICALLY HURT BY YOUR PARTNER OR EX-PARTNER?: NO

## 2025-02-13 SDOH — ECONOMIC STABILITY: HOUSING INSECURITY: IN THE PAST 12 MONTHS, HOW MANY TIMES HAVE YOU MOVED WHERE YOU WERE LIVING?: 1

## 2025-02-13 SDOH — SOCIAL STABILITY: SOCIAL INSECURITY: ARE YOU OR HAVE YOU BEEN THREATENED OR ABUSED PHYSICALLY, EMOTIONALLY, OR SEXUALLY BY ANYONE?: NO

## 2025-02-13 SDOH — ECONOMIC STABILITY: FOOD INSECURITY: HOW HARD IS IT FOR YOU TO PAY FOR THE VERY BASICS LIKE FOOD, HOUSING, MEDICAL CARE, AND HEATING?: SOMEWHAT HARD

## 2025-02-13 SDOH — SOCIAL STABILITY: SOCIAL INSECURITY: WITHIN THE LAST YEAR, HAVE YOU BEEN HUMILIATED OR EMOTIONALLY ABUSED IN OTHER WAYS BY YOUR PARTNER OR EX-PARTNER?: NO

## 2025-02-13 SDOH — ECONOMIC STABILITY: FOOD INSECURITY: WITHIN THE PAST 12 MONTHS, THE FOOD YOU BOUGHT JUST DIDN'T LAST AND YOU DIDN'T HAVE MONEY TO GET MORE.: NEVER TRUE

## 2025-02-13 SDOH — SOCIAL STABILITY: SOCIAL INSECURITY: WITHIN THE LAST YEAR, HAVE YOU BEEN AFRAID OF YOUR PARTNER OR EX-PARTNER?: NO

## 2025-02-13 SDOH — ECONOMIC STABILITY: FOOD INSECURITY: WITHIN THE PAST 12 MONTHS, YOU WORRIED THAT YOUR FOOD WOULD RUN OUT BEFORE YOU GOT THE MONEY TO BUY MORE.: NEVER TRUE

## 2025-02-13 SDOH — SOCIAL STABILITY: SOCIAL INSECURITY: HAS ANYONE EVER THREATENED TO HURT YOUR FAMILY OR YOUR PETS?: NO

## 2025-02-13 SDOH — ECONOMIC STABILITY: HOUSING INSECURITY: IN THE LAST 12 MONTHS, WAS THERE A TIME WHEN YOU WERE NOT ABLE TO PAY THE MORTGAGE OR RENT ON TIME?: NO

## 2025-02-13 SDOH — SOCIAL STABILITY: SOCIAL INSECURITY: DO YOU FEEL ANYONE HAS EXPLOITED OR TAKEN ADVANTAGE OF YOU FINANCIALLY OR OF YOUR PERSONAL PROPERTY?: NO

## 2025-02-13 SDOH — SOCIAL STABILITY: SOCIAL INSECURITY: DOES ANYONE TRY TO KEEP YOU FROM HAVING/CONTACTING OTHER FRIENDS OR DOING THINGS OUTSIDE YOUR HOME?: NO

## 2025-02-13 SDOH — SOCIAL STABILITY: SOCIAL INSECURITY: ARE THERE ANY APPARENT SIGNS OF INJURIES/BEHAVIORS THAT COULD BE RELATED TO ABUSE/NEGLECT?: NO

## 2025-02-13 SDOH — SOCIAL STABILITY: SOCIAL INSECURITY: HAVE YOU HAD THOUGHTS OF HARMING ANYONE ELSE?: NO

## 2025-02-13 SDOH — SOCIAL STABILITY: SOCIAL INSECURITY: DO YOU FEEL UNSAFE GOING BACK TO THE PLACE WHERE YOU ARE LIVING?: NO

## 2025-02-13 ASSESSMENT — PAIN - FUNCTIONAL ASSESSMENT
PAIN_FUNCTIONAL_ASSESSMENT: 0-10

## 2025-02-13 ASSESSMENT — COGNITIVE AND FUNCTIONAL STATUS - GENERAL
MOVING TO AND FROM BED TO CHAIR: A LITTLE
DAILY ACTIVITIY SCORE: 18
DRESSING REGULAR UPPER BODY CLOTHING: A LITTLE
CLIMB 3 TO 5 STEPS WITH RAILING: TOTAL
HELP NEEDED FOR BATHING: A LOT
MOBILITY SCORE: 17
TOILETING: A LITTLE
TOILETING: A LITTLE
CLIMB 3 TO 5 STEPS WITH RAILING: TOTAL
DRESSING REGULAR LOWER BODY CLOTHING: A LITTLE
WALKING IN HOSPITAL ROOM: A LOT
MOVING TO AND FROM BED TO CHAIR: A LITTLE
STANDING UP FROM CHAIR USING ARMS: A LITTLE
PERSONAL GROOMING: A LITTLE
STANDING UP FROM CHAIR USING ARMS: A LITTLE
CLIMB 3 TO 5 STEPS WITH RAILING: TOTAL
MOBILITY SCORE: 17
DRESSING REGULAR LOWER BODY CLOTHING: A LITTLE
DAILY ACTIVITIY SCORE: 18
CLIMB 3 TO 5 STEPS WITH RAILING: TOTAL
MOVING TO AND FROM BED TO CHAIR: A LITTLE
HELP NEEDED FOR BATHING: A LOT
DRESSING REGULAR UPPER BODY CLOTHING: A LITTLE
HELP NEEDED FOR BATHING: A LOT
DRESSING REGULAR UPPER BODY CLOTHING: A LITTLE
STANDING UP FROM CHAIR USING ARMS: A LITTLE
HELP NEEDED FOR BATHING: A LOT
PATIENT BASELINE BEDBOUND: NO
PERSONAL GROOMING: A LITTLE
DAILY ACTIVITIY SCORE: 18
DRESSING REGULAR LOWER BODY CLOTHING: A LITTLE
PERSONAL GROOMING: A LITTLE
TOILETING: A LITTLE
MOVING TO AND FROM BED TO CHAIR: A LITTLE
PERSONAL GROOMING: A LITTLE
STANDING UP FROM CHAIR USING ARMS: A LITTLE
TOILETING: A LITTLE
MOBILITY SCORE: 17
WALKING IN HOSPITAL ROOM: A LOT
DRESSING REGULAR UPPER BODY CLOTHING: A LITTLE
DRESSING REGULAR LOWER BODY CLOTHING: A LITTLE
DAILY ACTIVITIY SCORE: 18
WALKING IN HOSPITAL ROOM: A LOT
WALKING IN HOSPITAL ROOM: A LOT
MOBILITY SCORE: 17

## 2025-02-13 ASSESSMENT — LIFESTYLE VARIABLES
HOW MANY STANDARD DRINKS CONTAINING ALCOHOL DO YOU HAVE ON A TYPICAL DAY: PATIENT DOES NOT DRINK
HOW OFTEN DO YOU HAVE 6 OR MORE DRINKS ON ONE OCCASION: NEVER
AUDIT-C TOTAL SCORE: 0
HOW OFTEN DO YOU HAVE A DRINK CONTAINING ALCOHOL: NEVER
SKIP TO QUESTIONS 9-10: 1
AUDIT-C TOTAL SCORE: 0

## 2025-02-13 ASSESSMENT — PAIN DESCRIPTION - ORIENTATION
ORIENTATION: LOWER
ORIENTATION: LOWER

## 2025-02-13 ASSESSMENT — PAIN DESCRIPTION - LOCATION
LOCATION: BACK

## 2025-02-13 ASSESSMENT — ENCOUNTER SYMPTOMS
NAUSEA: 0
COUGH: 1
ABDOMINAL PAIN: 0
PALPITATIONS: 0
TREMORS: 0
HEMATURIA: 0
CONSTIPATION: 0
CHILLS: 0
SHORTNESS OF BREATH: 1
BACK PAIN: 0
FEVER: 0
WHEEZING: 0
VOMITING: 0
WOUND: 0
FLANK PAIN: 0
FATIGUE: 1
HEADACHES: 1
CHEST TIGHTNESS: 0
DIARRHEA: 0
JOINT SWELLING: 0
WEAKNESS: 0

## 2025-02-13 ASSESSMENT — ACTIVITIES OF DAILY LIVING (ADL)
GROOMING: NEEDS ASSISTANCE
LACK_OF_TRANSPORTATION: YES
TOILETING: NEEDS ASSISTANCE
JUDGMENT_ADEQUATE_SAFELY_COMPLETE_DAILY_ACTIVITIES: YES
ADEQUATE_TO_COMPLETE_ADL: YES
HEARING - LEFT EAR: FUNCTIONAL
DRESSING YOURSELF: NEEDS ASSISTANCE
HEARING - RIGHT EAR: FUNCTIONAL
FEEDING YOURSELF: INDEPENDENT
BATHING: NEEDS ASSISTANCE
LACK_OF_TRANSPORTATION: NO
ASSISTIVE_DEVICE: WALKER
LACK_OF_TRANSPORTATION: YES
PATIENT'S MEMORY ADEQUATE TO SAFELY COMPLETE DAILY ACTIVITIES?: YES
WALKS IN HOME: NEEDS ASSISTANCE

## 2025-02-13 ASSESSMENT — PAIN SCALES - GENERAL
PAINLEVEL_OUTOF10: 7
PAINLEVEL_OUTOF10: 10 - WORST POSSIBLE PAIN
PAINLEVEL_OUTOF10: 10 - WORST POSSIBLE PAIN
PAINLEVEL_OUTOF10: 7
PAINLEVEL_OUTOF10: 10 - WORST POSSIBLE PAIN
PAINLEVEL_OUTOF10: 10 - WORST POSSIBLE PAIN
PAINLEVEL_OUTOF10: 5 - MODERATE PAIN

## 2025-02-13 ASSESSMENT — PATIENT HEALTH QUESTIONNAIRE - PHQ9
2. FEELING DOWN, DEPRESSED OR HOPELESS: NEARLY EVERY DAY
SUM OF ALL RESPONSES TO PHQ9 QUESTIONS 1 & 2: 6
1. LITTLE INTEREST OR PLEASURE IN DOING THINGS: NEARLY EVERY DAY

## 2025-02-13 NOTE — CARE PLAN
The patient's goals for the shift include      The clinical goals for the shift include Pt will be HDS throughout shift      Problem: Pain - Adult  Goal: Verbalizes/displays adequate comfort level or baseline comfort level  Outcome: Progressing     Problem: Safety - Adult  Goal: Free from fall injury  Outcome: Progressing     Problem: Discharge Planning  Goal: Discharge to home or other facility with appropriate resources  Outcome: Progressing     Problem: Chronic Conditions and Co-morbidities  Goal: Patient's chronic conditions and co-morbidity symptoms are monitored and maintained or improved  Outcome: Progressing     Problem: Nutrition  Goal: Nutrient intake appropriate for maintaining nutritional needs  Outcome: Progressing     Problem: Skin  Goal: Decreased wound size/increased tissue granulation at next dressing change  Outcome: Progressing  Flowsheets (Taken 2/13/2025 0142)  Decreased wound size/increased tissue granulation at next dressing change:   Promote sleep for wound healing   Protective dressings over bony prominences  Goal: Participates in plan/prevention/treatment measures  Outcome: Progressing  Flowsheets (Taken 2/13/2025 0142)  Participates in plan/prevention/treatment measures: Elevate heels  Goal: Prevent/manage excess moisture  Outcome: Progressing  Flowsheets (Taken 2/13/2025 0142)  Prevent/manage excess moisture:   Cleanse incontinence/protect with barrier cream   Moisturize dry skin  Goal: Prevent/minimize sheer/friction injuries  Outcome: Progressing  Flowsheets (Taken 2/13/2025 0142)  Prevent/minimize sheer/friction injuries: Use pull sheet  Goal: Promote/optimize nutrition  Outcome: Progressing  Flowsheets (Taken 2/13/2025 0142)  Promote/optimize nutrition: Monitor/record intake including meals  Goal: Promote skin healing  Outcome: Progressing  Flowsheets (Taken 2/13/2025 0142)  Promote skin healing: Protective dressings over bony prominences     Problem: Fall/Injury  Goal: Not fall by  end of shift  Outcome: Progressing  Goal: Be free from injury by end of the shift  Outcome: Progressing  Goal: Verbalize understanding of personal risk factors for fall in the hospital  Outcome: Progressing  Goal: Verbalize understanding of risk factor reduction measures to prevent injury from fall in the home  Outcome: Progressing  Goal: Use assistive devices by end of the shift  Outcome: Progressing  Goal: Pace activities to prevent fatigue by end of the shift  Outcome: Progressing     Problem: Pain  Goal: Takes deep breaths with improved pain control throughout the shift  Outcome: Progressing  Goal: Turns in bed with improved pain control throughout the shift  Outcome: Progressing  Goal: Walks with improved pain control throughout the shift  Outcome: Progressing  Goal: Performs ADL's with improved pain control throughout shift  Outcome: Progressing  Goal: Participates in PT with improved pain control throughout the shift  Outcome: Progressing  Goal: Free from opioid side effects throughout the shift  Outcome: Progressing  Goal: Free from acute confusion related to pain meds throughout the shift  Outcome: Progressing

## 2025-02-13 NOTE — ED NOTES
Pt arrives to ED via EMS from Home    Code Status:  Full Code    HPI     Patient presents emergency department for weakness, cough, passive suicidality. Patient was on palliative care through [facility] however states he was fired because he could not make it to the appointments. States has been dealing with difficulty breathing for years. EMS has concern for possible viral infection given other people in the home have viral sounding illnesses. Patient may statements of not wanting to deal with his illness anymore to nursing staff. To me patient denied active suicide ideation, plan of suicide. He does tell me that he is interested in hospice care. Patient is also endorsing some left-sided cramping abdominal pain. Patient wears oxygen at home       Chief Complaint   Patient presents with    Weakness, Gen    Cough    Suicidal       BP (!) 133/91   Pulse (!) 103   Temp 36.8 °C (98.3 °F)   Resp 13   Wt 57.6 kg (127 lb)   SpO2 100%     [place] Coma Scale Score: 15      LDA:   Peripheral IV 02/12/25 20 G Right Forearm (Active)   Placement Date/Time: 02/12/25 0958   Placed by External Staff?: EMS  Size (Gauge): 20 G  Orientation: Right  Location: Forearm  Placed by: ems   Number of days: 0        BACKGROUND  Past Medical History:   Diagnosis Date    COPD (chronic obstructive pulmonary disease) (Multi)     Myocardial infarction (Multi)      History reviewed. No pertinent surgical history.  No current facility-administered medications on file prior to encounter.     Current Outpatient Medications on File Prior to Encounter   Medication Sig Dispense Refill    albuterol 90 mcg/actuation inhaler Inhale 2 puffs every 6 hours if needed.      atorvastatin (Lipitor) 80 mg tablet Take 1 tablet (80 mg) by mouth once daily.      aspirin 81 mg EC tablet Take 1 tablet (81 mg) by mouth once daily.      Breztri Aerosphere 160-9-4.8 mcg/actuation HFA aerosol inhaler Inhale 2 puffs 2 times a day.      escitalopram (Lexapro) 20 mg tablet  Take 1 tablet (20 mg) by mouth once daily.      metoprolol succinate XL (Toprol-XL) 50 mg 24 hr tablet Take 1 tablet (50 mg) by mouth once daily.      roflumilast (Daliresp) 500 mcg tablet Take 1 tablet (500 mcg) by mouth once daily.      Symbicort 160-4.5 mcg/actuation inhaler Inhale 2 puffs 2 times a day.          ASSESSMENT  Diagnoses as of 02/12/25 2358   Pneumonia of both lungs due to infectious organism, unspecified part of lung   Admission for hospice care       Medications Currently Running:        Medications Given:  ED Medication Administration from 02/12/2025 0829 to 02/12/2025 2358         Date/Time Order Dose Route Action Action by     02/12/2025 1004 EST iohexol (OMNIPaque) 350 mg iodine/mL solution 75 mL 75 mL intravenous Given WILLIAM Neville     02/12/2025 1401 EST amoxicillin-pot clavulanate (Augmentin) 875-125 mg per tablet 1 tablet 1 tablet oral Given WILLIAM Robles     02/12/2025 1402 EST azithromycin (Zithromax) tablet 500 mg 500 mg oral Given Herington, S     02/12/2025 1403 EST predniSONE (Deltasone) tablet 60 mg 60 mg oral Given Herington, S     02/12/2025 1749 EST morphine injection 4 mg 4 mg intravenous Given ERVIN Weldon     02/12/2025 2023 EST amoxicillin-pot clavulanate (Augmentin) 875-125 mg per tablet 1 tablet 1 tablet oral Given Rhett R     02/12/2025 2209 EST morphine injection 4 mg 4 mg intravenous Given Rhett R                 RESULTS    Imaging:  CT chest abdomen pelvis w IV contrast   Final Result   CHEST:   Mild new mediastinal adenopathy and very mild bilateral hilar   adenopathy, most likely infectious/inflammatory.        Mild secretions in the trachea and main bronchi as described, in   addition to secretions evident in several segmental/subsegmental   bronchi in the lower lobes. In addition, there is bronchial wall   thickening in both lower lobes and to a lesser extent the right   middle lobe and left lingula consistent with acute bronchitis.   Finally, there are areas of  reticulonodular and more confluent   infiltrative density in the lower lobes as described, right greater   than left, consistent with pneumonia. There are also scattered new   areas of tiny nodules and reticulonodular densities in the right   upper lobe and right middle lobe, superior segment of the right lower   lobe, and in the left lingula consistent with acute bronchiolitis.        Underlying emphysema.             ABDOMEN/PELVIS:   Arthritic changes in the lumbosacral spine as described. No CT   evidence of fracture of the lumbar spine or pelvis in this exam.        Aortoiliac calcifications. 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 Please see above for details.        Small right lobe liver cyst.        Previous appendectomy.        MACRO:   None        Signed by: Tyshawn Barahona 2/12/2025 10:46 AM   Dictation workstation:   [id number]      XR chest 1 view   Final Result   New 1.2 cm pulmonary nodule raising concern for potential neoplasm.   CT of the thorax recommended for further evaluation.        Worsening COPD.        MACRO:   Critical Finding:  See findings. Notification was initiated on   2/12/2025 at 9:45 am by  Yan Almaraz.  (**-YCF-**) Instructions:        Signed by: Yan Almaraz 2/12/2025 9:47 AM   Dictation workstation:   [id number]         }  Labs ::99  Abnormal Labs Reviewed   CBC WITH AUTO DIFFERENTIAL - Abnormal; Notable for the following components:       Result Value    Hemoglobin 12.5 (*)     Hematocrit 39.7 (*)     MCHC 31.5 (*)     All other components within normal limits   COMPREHENSIVE METABOLIC PANEL - Abnormal; Notable for the following components:    Chloride 97 (*)     Bicarbonate 36 (*)     Anion Gap 9 (*)     Total Protein 6.3 (*)     ALT 6 (*)     All other components within normal limits   BLOOD GAS VENOUS FULL PANEL - Abnormal; Notable for the following components:    POCT pCO2, Venous 60 (*)     POCT pO2, Venous 47 (*)     POCT Hematocrit Calculated, Venous  37.0 (*)     POCT Sodium, Venous 135 (*)     POCT Base Excess, Venous 8.5 (*)     POCT HCO3 Calculated, Venous 35.5 (*)     POCT Hemoglobin, Venous 12.2 (*)     POCT Anion Gap, Venous 5.0 (*)     All other components within normal limits                Payton Delaney RN  02/12/25 [id number]

## 2025-02-13 NOTE — PROGRESS NOTES
02/13/25 1013   Discharge Planning   Living Arrangements Family members   Support Systems Family members   Assistance Needed ADL/IADL   Type of Residence Private residence   Home or Post Acute Services Post acute facilities (Rehab/SNF/etc)   Type of Post Acute Facility Services Rehab   Expected Discharge Disposition SNF   Does the patient need discharge transport arranged? Yes   RoundTrip coordination needed? Yes   Housing Stability   At any time in the past 12 months, were you homeless or living in a shelter (including now)? N   Transportation Needs   In the past 12 months, has lack of transportation kept you from meetings, work, or from getting things needed for daily living? No   Stroke Family Assessment   Stroke Family Assessment Needed No   Intensity of Service   Intensity of Service 0-30 min     I spoke with patient to introduce discharge planning and explain role of TCC. Pt states he has been living with his nephew and his family (total of 6 people) and 'cant go back there'.  He tells me that he has been using a walker but still having falls d/t weakness/SOB.  He has not been able to get in shower and has been sink bathing.  He is on 5L nc ([employer]) and has portable concentrator.  He does not drive and had been relying on family which he has missed appts with [facility] causing him to be dismissed from the program.  He states he goes to [facility] to see PCP - this appt he really works at getting to- and is not sure who he sees as 'they are always changing'.  The chart lists Daniele Montes MD.   I inquired if he has a plan of where he would go live if he can't go back to his nephews and he described a nursing home. As we spoke further, he is looking at getting into a SNF 'to get stronger' and 'the nurse is helping with this'. I let him know that it would be me making that happen and explained that PT/OT would be by to evaluate his mobility and then we can talk about that further.  Pt did also  mention wanting Hospice as well and I let him know that SW would be by to talk about that further. Will continue to follow.

## 2025-02-13 NOTE — PROGRESS NOTES
Occupational Therapy                 Therapy Communication Note    Patient Name: Ino Pierce  MRN: [id number]  Department: [facility] 2 E  Room: [facility]-A  Today's Date: 2/13/2025     Discipline: Occupational Therapy    OT Missed Visit: Yes     Missed Visit Reason: Missed Visit Reason: Patient refused (Pt politely declined working with therapy today. Reports not feeling up to getting out of bed. Agreeable to OT returning tomorrow for evaulation.)    Missed Time: Attempt    Comment:

## 2025-02-13 NOTE — PROGRESS NOTES
Physical Therapy                 Therapy Communication Note    Patient Name: Ino Pierce  MRN: [id number]  Department: [facility] 2 E  [facility]  Today's Date: 2/13/2025     Discipline: Physical Therapy          Missed Visit Reason:  Patient refused (Chart reviewed. RN approved PT evaluation. Approached patient for assessment, educated pt in what needed to observe. Patient states he does not feel up to participating in PT today, but agreeable to try tomorrow. PT to reattempt tomorrow.)    Missed Time: Attempt

## 2025-02-13 NOTE — CONSULTS
"Nutrition Initial Assessment:   Nutrition Assessment    Reason for Assessment: Admission nursing screening    Medical history per chart: stage IV severe COPD, chronic hypoxic hypercapnic respiratory failure on 5 L nasal cannula, cigarette dependence, CAD, HTN, HLD, HFrEF, seizure in 2016, DAV     Admitted for Admission for hospice care, Pneumonia of both lungs due to infectious organism     2/13: Pt reported good appetite today, no GI complaints. Weight history as below. Pt receptive to Ensure Plus High Protein drink BID, will send. Pt admission Dx, PMH, labs, medications, allergies, diet orders, skin integrity reviewed. RD to follow per POC, protocol.     Nutrition History:  Energy Intake: Fair 50-75 %  Food and Nutrient History: Pt reported fair appetite/PO intake with N/V x1 day PTA.  Vitamin/Herbal Supplement Use: None per pt.  Pattern of Alcohol Consumption: No history on file for alcohol use per H&P.       Average meal Intake during admission:  not currently recorded    Dietary Orders (From admission, onward)       Start     Ordered    02/13/25 1037  Oral nutritional supplements  Until discontinued        Question Answer Comment   Deliver with Breakfast    Deliver with Dinner    Select supplement: Ensure Plus High Protein        02/13/25 1036    02/13/25 0049  May Participate in Room Service  ( ROOM SERVICE MAY PARTICIPATE)  Once        Question:  .  Answer:  Yes    02/13/25 0048    02/12/25 2324  Adult diet Regular  Diet effective now        Question:  Diet type  Answer:  Regular    02/12/25 2324                    Anthropometrics:  Height: 167.6 cm (5' 6\")   Weight: 52.3 kg (115 lb 4.8 oz)   BMI (Calculated): 18.62  IBW/kg (Dietitian Calculated): 64.5 kg        Weight History:   Wt Readings from Last 10 Encounters:   02/13/25 52.3 kg (115 lb 4.8 oz)       Weight Change %:  Weight History / % Weight Change: Weight loss indicated on MST. Pt reported UBW around 180 pounds, believes he last weighed 180 pounds " about a year ago. Chart review records show weight of 66.3kg (1/9/24), a 13% loss x 6 months when compared to 59.9kg (7/11/24).  Significant Weight Loss: Yes  Interpretation of Weight Loss: >10% in 6 months    Nutrition Focused Physical Exam Findings:  Subcutaneous Fat Loss:   Orbital Fat Pads: Mild-Moderate (slight dark circles and slight hollowing) (Exam limited d/t room light off)  Buccal Fat Pads: Mild-Moderate (flat cheeks, minimal bounce)  Muscle Wasting:  Temporalis: Mild-Moderate (slight depression)  Pectoralis (Clavicular Region): Mild-Moderate (some protrusion of clavicle)  Interosseous: Mild-Moderate (slightly depressed area between thumb and forefinger)  Edema:  Edema: none  Physical Findings:  Skin: Negative (RN stated no open areas)    Nutrition Significant Labs:    Results from last 7 days   Lab Units 02/13/25 0313 02/12/25  0911   GLUCOSE mg/dL 190* 85   SODIUM mmol/L 133* 138   POTASSIUM mmol/L 4.3 3.9   CHLORIDE mmol/L 96* 97*   CO2 mmol/L 29 36*   BUN mg/dL 21 9   CREATININE mg/dL 0.74 0.62   EGFR mL/min/1.73m*2 >90 >90   CALCIUM mg/dL 9.1 8.8     Lab Results   Component Value Date    BA1C 5 12/05/2023           Nutrition Specific Medications:  Meds reviewed/noted.   aspirin, 81 mg, oral, Daily  atorvastatin, 80 mg, oral, Daily  azithromycin, 500 mg, intravenous, q24h  cefTRIAXone, 2 g, intravenous, q24h  escitalopram, 20 mg, oral, Daily  tiotropium, 2 puff, inhalation, Daily   And  fluticasone furoate-vilanteroL, 1 puff, inhalation, Daily  ipratropium-albuteroL, 3 mL, nebulization, 4x daily  metoprolol succinate XL, 50 mg, oral, Daily  pantoprazole, 40 mg, oral, Daily before breakfast   Or  pantoprazole, 40 mg, intravenous, Daily before breakfast             I/O:    ; Stool Appearance: Soft, Loose (02/13/25 0311)    Estimated Needs:   Total Energy Estimated Needs in 24 hours (kCal):  (6007-5020)  Method for Estimating Needs: 30-35kcal/kg actual body weight  Total Protein Estimated Needs in 24  Hours (g):  ()  Method for Estimating 24 Hour Protein Needs: 1.5-2g/kg actual body weight  Total Fluid Estimated Needs in 24 Hours (mL):  (7397-4603)  Method for Estimating 24 Hour Fluid Needs: 1mL/kcal        Nutrition Diagnosis   Malnutrition Diagnosis  Patient has Malnutrition Diagnosis: Yes  Diagnosis Status: New  Malnutrition Diagnosis: Severe malnutrition related to chronic disease or condition  Related to: COPD metabolism  As Evidenced by: moderate subcutaneous fat, muscle loss per NFPE, >10% weight loss x 6 months            Nutrition Interventions/Recommendations   Nutrition prescription for oral nutrition    Nutrition Recommendations:  Individualized Nutrition Prescription Provided for : Continue current diet order.    Nutrition Interventions/Goals:   Interventions: Meals and snacks, Medical food supplement  Meals and Snacks: General healthful diet  Goal: consume >75% of meals  Medical Food Supplement: Commercial beverage medical food supplement therapy  Goal: consume ONS BID  Coordination of Care with Providers: Nursing (Elayne, RN)      Education Documentation  Nutrition Related Education, taught by Elda Miller RD at 2/13/2025 10:35 AM.  Learner: Patient  Readiness: Acceptance  Method: Explanation  Response: Verbalizes Understanding  Comment: Discussed diet, ONS.              Nutrition Monitoring and Evaluation   Food/Nutrient Related History Monitoring  Monitoring and Evaluation Plan: Intake / amount of food  Intake / Amount of food: Consumes at least 75% or more of meals/snacks/supplements    Anthropometric Measurements  Monitoring and Evaluation Plan: Body weight  Body Weight: Body weight - Promote weight restoration    Biochemical Data, Medical Tests and Procedures  Monitoring and Evaluation Plan: Electrolyte/renal panel, Glucose/endocrine profile  Electrolyte and Renal Panel: Electrolytes within normal limits  Glucose/Endocrine Profile: Glucose within normal limits (  mg/dL)    Physical Exam Findings  Monitoring and Evaluation Plan: Adipose, Muscles, Skin  Adipose Finding: Loss of subcutaneous fat  Criteria: prevent further loss  Muscle Finding: Muscle atrophy  Criteria: prevent further loss  Skin Finding: Promote intact skin - Promote skin integrity    Goal Status: New goal(s) identified    Time Spent (min): 45 minutes

## 2025-02-13 NOTE — PROGRESS NOTES
Ino Pierce is a 60 y.o. male on day 0 of admission presenting with Pneumonia of both lungs due to infectious organism, unspecified part of lung.      Subjective   Ino Pierce is a 60 y.o. male with PMHx s/f stage IV severe COPD, chronic hypoxic hypercapnic respiratory failure on 5 L nasal cannula, cigarette dependence, CAD, HTN, HLD, HFrEF, seizure in 2016, DAV presenting with worsening shortness of breath.  Has associated headache, productive cough and generalized malaise for past few days.  He states that he is following with palliative care at [facility] for his COPD but was kicked out of the program because he could not make it to the appointments.  He notes he has been having difficulty this year and is emotional exhausted as there were four family members and two pets passed away, all in this year. He got kicked out of his house and is currently living with his nephew. He has no active suicide ideation nor plan of suicide. He is not taking any medications, has been off of his inhalers for a while. Does not know what medications he was on.  Denies lightheadedness, fever chills nausea vomiting, chest pain, leg swelling.  He states almost everyone is sick at his place right now.      ED Course (Summary - please note all labs, imaging studies, and interventions noted below have been personally reviewed and/or interpreted on day of admission):   Vitals on presentation: 98.3 F, 105 bpm, 20 RR, 144/91, 90% on 5 L nasal cannula  Labs: CMP, CBC, VBG largely unremarkable  Viral studies negative  EKG: Sinus tachycardia at 101 bpm, no acute STEMI  Imaging: CXR-new 1.2 cm pulmonary nodule concerning for potential neoplasm  CT chest AP-mild new mediastinal adenopathy and bilateral hilar adenopathy.  Mild secretion in trachea and main bronchi, bronchial wall thickening in both lower lobes consistent with acute bronchitis.  Areas of reticular nodule and infiltrate density in lower lobes consistent with Imodium.  Scattered  new area of tiny nodule and reticular nodular density in right upper and middle lobe consistent with acute bronchiolitis.  Underlying emphysema.  Aortoiliac calcifications.  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  Small right lobe liver cyst.  Previous appendectomy.  Interventions: Zithromax, Augmentin, morphine 4 mg x 2, prednisone 60 mg, admission for further management  2/13: Patient was seen and examined.  Saturating well on 6 5 L nasal cannula oxygen which is his baseline.  Continues to complain of shortness of breath.  Was also complaining of pain.  Urine Legionella and strep antigen are negative.  Will continue current antibiotics breathing treatment and steroids.  Palliative care consulted    Objective     Last Recorded Vitals  /78 (BP Location: Left arm, Patient Position: Lying)   Pulse 84   Temp 36.7 °C (98 °F) (Temporal)   Resp 18   Wt 52.3 kg (115 lb 4.8 oz)   SpO2 99%   Intake/Output last 3 Shifts:    Intake/Output Summary (Last 24 hours) at 2/13/2025 1505  Last data filed at 2/13/2025 1300  Gross per 24 hour   Intake 410 ml   Output 200 ml   Net 210 ml       Admission Weight  Weight: 57.6 kg (127 lb) (02/12/25 0832)    Daily Weight  02/13/25 : 52.3 kg (115 lb 4.8 oz)    Image Results  CT chest abdomen pelvis w IV contrast  Narrative: Interpreted By:  Tyshawn Barahona,   STUDY:  CT CHEST ABDOMEN PELVIS W IV CONTRAST;  2/12/2025 10:19 am      INDICATION:  59 y/o   M with  Signs/Symptoms:left abd pain, sob.          LIMITATIONS:  None..      ACCESSION NUMBER(S):  [id number]      ORDERING CLINICIAN:  ELIZABETH ARMENDARIZ      TECHNIQUE:  After the administration of IV nonionic contrast, images were  obtained from the thoracic inlet down through the symphysis pubis.  Sagittal and coronal reconstruction images were generated.  Mediastinal, lung, bone, and liver windows were reviewed. Omnipaque  350   75 ML      COMPARISON:  CT chest with contrast dated 03/18/2022.      FINDINGS:  CHEST FINDINGS:      CHEST  WALL/BASE OF THE NECK:  The chest wall was grossly intact.  No thyromegaly or thyroid mass.      LUNGS/ PLEURA/ AND TRACHEA:  No pleural effusion. No pneumothorax.  Mild secretions along the posterior wall of the distal trachea..  There are very mild secretions along the posterior walls of each main  bronchus. There is bilateral bronchial wall thickening, most  pronounced in the right middle lobe and left lingula and the lower  lobes. There are some secretions in several segmental and  subsegmental bronchi in the lower lobes, right greater than left.  There are infiltrative densities and also reticulonodular densities  all all posteriorly in both lower lobes consistent with pneumonia,  right greater than left. There are emphysematous changes in the  lungs. There are several scattered small nodules peripherally in the  lateral and posterior right upper lobe, for example on axial image  98. These were not present previously. There are multiple tiny  nodules laterally in the inferior aspect of the right upper lobe  centered on axial image 184, not evident previously. There are also  multiple tiny peripheral nodules laterally in the right middle lobe,  for example on image 223, not present previously. There are multiple  new tiny nodules and reticulonodular densities in the superior  segment of the right lower lobe. There are new tiny nodules and  reticulonodular densities in the left lingula.      MEDIASTINUM/ARMIDA:  Development of mild mediastinal adenopathy with lymph nodes measuring  10 mm short axis or less. Very mild bilateral hilar adenopathy. No  axillary adenopathy. No cardiomegaly.  No pericardial effusion.  There was no aneurysmal dilatation or dissection of the thoracic  aorta.      BONES:  No destructive lytic or blastic bone lesion.          ABDOMEN/PELVIS FINDINGS:      LIVER:  No hepatomegaly.  Liver density was  within the limits of normal.  There is a 6 mm cyst in segment 6 of the liver on image 122.  Normal  portal vein enhancement..      GALLBLADDER:  No calcified stone, gallbladder wall thickening, or adjacent edema.      BILE DUCTS:  No intrahepatic biliary ductal dilatation.  Common bile duct was within the limits of normal.      SPLEEN:  No splenomegaly.  No splenic  mass.      PANCREAS:  No pancreatic mass or inflammation, or ductal dilatation.      KIDNEYS/ADRENALS:  No adrenal mass or enlargement.  No calcified stone, hydronephrosis, mass, or perinephric edema in  either kidney. No ureteral stone or dilatation.      BLADDER/PELVIS:  Urinary bladder was grossly intact.  No prostate enlargement.      GREAT VESSELS/RETROPERITONEUM:  There is severe stenosis of the proximal to mid SMA caused by mural  calcification and posterior wall mural thrombus. Moderate aortoiliac  calcifications. Otherwise, abdominal aorta and IVC were grossly  intact. No suspicious retroperitoneal adenopathy.  No suspicious mesenteric adenopathy.  No suspicious pelvic or inguinal adenopathy.      PERITONEUM:  No ascites.  No pneumoperitoneum.  No peritoneal or mesenteric mass or inflammation.      BOWEL:  The stomach was  grossly intact.  There was no small bowel dilatation or small bowel wall thickening.  No small-bowel obstruction. Mild scattered retained colonic stool and  gas. There was no colonic wall thickening or large bowel obstruction.  No edema adjacent to the colon. The cecal appendix was surgically  absent..      BONES:  No destructive lytic or blastic bone lesion. Bilateral L4 pars  defects. Grade 1 anterolisthesis of L4 over L5. Mild-to-moderate disc  space narrowing with endplate spurring at that level. Inter facet  hypertrophic arthritic changes at L5-S1 with mild disc space  narrowing at that level. There is mild endplate osteophytosis at L1-2  and L2-3. Slight disc space narrowing at L1-2. Incidental note of  partial sacralization of the left L5 transverse process. Mild  sclerotic arthritic changes in both SI  joints.      ABDOMINAL WALL:  Unremarkable.      Impression: CHEST:  Mild new mediastinal adenopathy and very mild bilateral hilar  adenopathy, most likely infectious/inflammatory.      Mild secretions in the trachea and main bronchi as described, in  addition to secretions evident in several segmental/subsegmental  bronchi in the lower lobes. In addition, there is bronchial wall  thickening in both lower lobes and to a lesser extent the right  middle lobe and left lingula consistent with acute bronchitis.  Finally, there are areas of reticulonodular and more confluent  infiltrative density in the lower lobes as described, right greater  than left, consistent with pneumonia. There are also scattered new  areas of tiny nodules and reticulonodular densities in the right  upper lobe and right middle lobe, superior segment of the right lower  lobe, and in the left lingula consistent with acute bronchiolitis.      Underlying emphysema.          ABDOMEN/PELVIS:  Arthritic changes in the lumbosacral spine as described. No CT  evidence of fracture of the lumbar spine or pelvis in this exam.      Aortoiliac calcifications. Severe stenosis of the proximal to mid SMA  as described. Please see above for details.      Small right lobe liver cyst.      Previous appendectomy.      MACRO:  None      Signed by: Tyshawn Barahona 2/12/2025 10:46 AM  Dictation workstation:   [id number]  Electrocardiogram, 12-lead  Sinus tachycardia  Biatrial enlargement  ST elevation, consider inferior injury    See ED provider note for full interpretation and clinical correlation  Confirmed by Sandy Valles ([id number]) on 2/12/2025 9:56:47 AM  XR chest 1 view  Narrative: Interpreted By:  Yan Almaraz,   STUDY:  XR CHEST 1 VIEW      INDICATION:  Signs/Symptoms:sob.      COMPARISON:  March 18, 2022      ACCESSION NUMBER(S):  [id number]      ORDERING CLINICIAN:  ELIZABETH ARMENDARIZ      FINDINGS:  1.2 cm suspected right lower lobe pulmonary nodule new from  prior  study. This raises concern for possible neoplasm.      COPD changes have worsened in the interval with more flattening of  the diaphragms and more coarse interstitial markings.      No consolidation, effusion, edema, pneumothorax.      Impression: New 1.2 cm pulmonary nodule raising concern for potential neoplasm.  CT of the thorax recommended for further evaluation.      Worsening COPD.      MACRO:  Critical Finding:  See findings. Notification was initiated on  2/12/2025 at 9:45 am by  Yan Almaraz.  (**-YCF-**) Instructions:      Signed by: Yan Almaraz 2/12/2025 9:47 AM  Dictation workstation:   [id number]      Physical Exam  Vitals:    02/13/25 1300   BP: 152/78   Pulse: 84   Resp: 18   Temp: 36.7 °C (98 °F)   SpO2: 99%     Constitutional: Pleasant and cooperative. Laying in bed in no acute distress. Conversant.   Skin: Warm and dry; no obvious lesions, rashes, pallor, or jaundice.   Eyes: EOMI. Anicteric sclera.   ENT: Mucous membranes moist; no obvious injury or deformity appreciated.   Head and Neck: Normocephalic, atraumatic. ROM preserved. Trachea midline. No appreciable JVD.   Respiratory: Nonlabored on 5L NC. Lungs sound diminished bilaterally without obvious adventitious sounds. Chest rise is equal.  Cardiovascular: RRR. No gross murmur, gallop, or rub. Extremities are warm and well-perfused with good capillary refill (< 3 seconds). No chest wall tenderness.   GI: Abdomen soft, tender with minimal palpation diffuse throughout, nondistended. No obvious organomegaly appreciated. Bowel sounds are present.  : No CVA tenderness.   MSK: No gross abnormalities appreciated. No limitations to AROM/PROM appreciated.   Extremities: No cyanosis, edema, or clubbing evident. Neurovascularly intact.   Neuro: A&Ox3. CN 2-12 grossly intact. Able to respond to questions appropriately and clearly. No acute focal neurologic deficits appreciated.  Psych: Depressed.   Relevant Results               Assessment/Plan                   Assessment & Plan  Pneumonia of both lungs due to infectious organism, unspecified part of lung    60 y.o. male with PMHx s/f stage IV severe COPD, chronic hypoxic hypercapnic respiratory failure on 5 L nasal cannula, cigarette dependence, CAD, HTN, HLD, HFrEF, seizure in 2016, DAV presenting with worsening shortness of breath.     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 on CT  -Restarted on home Lipitor  -Vascular surgery consult     Anxiety and depression  Passive suicidal ideation  -EPAT consult appreciated; he does not meet inpatient psychiatric admission but lacks the capacity to leave AMA at this time  -Restarted home Lexapro  -Psych evaluation  -Social work consult for hospice       CAD, HTN, HLD, HFrEF  -Restarted on home aspirin, Lipitor, Toprol XL     Cigarette dependence  -Currently still smoking 1 to 2 cigarettes/day  -NRT offered and declined at this time     Diet: Regular  DVT Prophylaxis: Low risk score, ambulate if able  Code Status: Full code, incomplete discussion-patient lacks capacity  Case Discussed With: ED provider  Additional Sources Reviewed: Pulmonology     Anticipated Length of Stay (LOS): Patient will require 2+ midnights         Malnutrition Diagnosis Status: New  Malnutrition Diagnosis: Severe malnutrition related to chronic disease or condition  Related to: COPD metabolism  As Evidenced by: moderate subcutaneous fat, muscle loss per NFPE, >10% weight loss x 6 months  I agree  with the dietitian's malnutrition diagnosis.         Spent 35 minutes in the follow-up management of this patient today    Mariama Chacko MD

## 2025-02-13 NOTE — PROGRESS NOTES
Emergency Medicine Transition of Care Note.    I received Ino Pierce in signout from Dr. Tovar.  Please see the previous ED provider note for all HPI, PE and MDM up to the time of signout at 1600. This is in addition to the primary record.    In brief Ino Pierce is an 60 y.o. male presenting for   Chief Complaint   Patient presents with    Weakness, Gen    Cough    Suicidal     At the time of signout we were awaiting: Psychiatry evaluation    Diagnoses as of 02/12/25 2301   Pneumonia of both lungs due to infectious organism, unspecified part of lung   Admission for hospice care       Medical Decision Making    60-year-old male presents to ED with generalized weakness, cough and passive suicidality.  Workup had shown a possible pneumonia.  He had received antibiotics and steroids.  At signout we are pending psych evaluation for suicidality.  Psych saw the patient and do not believe he requires inpatient psychiatric management.  Will admit patient to medical floor for initiation of hospice care and placement  Final diagnoses:   [J18.9] Pneumonia of both lungs due to infectious organism, unspecified part of lung   [Z51.5] Admission for hospice care           Procedure  Procedures    [place] Pompa,

## 2025-02-13 NOTE — H&P
[facility] - GENERAL MEDICINE HISTORY AND PHYSICAL    History Obtained From (Primary Source): Patient  Collateral History (Secondary Sources): D/w ED provider, chart review    History Of Present Illness (HPI):  Ino Pierce is a 60 y.o. male with PMHx s/f stage IV severe COPD, chronic hypoxic hypercapnic respiratory failure on 5 L nasal cannula, cigarette dependence, CAD, HTN, HLD, HFrEF, seizure in 2016, DAV presenting with worsening shortness of breath.  Has associated headache, productive cough and generalized malaise for past few days.  He states that he is following with palliative care at [facility] for his COPD but was kicked out of the program because he could not make it to the appointments.  He notes he has been having difficulty this year and is emotional exhausted as there were four family members and two pets passed away, all in this year. He got kicked out of his house and is currently living with his nephew. He has no active suicide ideation nor plan of suicide. He is not taking any medications, has been off of his inhalers for a while. Does not know what medications he was on.  Denies lightheadedness, fever chills nausea vomiting, chest pain, leg swelling.  He states almost everyone is sick at his place right now.     ED Course (Summary - please note all labs, imaging studies, and interventions noted below have been personally reviewed and/or interpreted on day of admission):   Vitals on presentation: 98.3 F, 105 bpm, 20 RR, 144/91, 90% on 5 L nasal cannula  Labs: CMP, CBC, VBG largely unremarkable  Viral studies negative  EKG: Sinus tachycardia at 101 bpm, no acute STEMI  Imaging: CXR-new 1.2 cm pulmonary nodule concerning for potential neoplasm  CT chest AP-mild new mediastinal adenopathy and bilateral hilar adenopathy.  Mild secretion in trachea and main bronchi, bronchial wall thickening in both lower lobes consistent with acute bronchitis.  Areas of reticular nodule and infiltrate  density in lower lobes consistent with Imodium.  Scattered new area of tiny nodule and reticular nodular density in right upper and middle lobe consistent with acute bronchiolitis.  Underlying emphysema.  Aortoiliac calcifications.  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  Small right lobe liver cyst.  Previous appendectomy.  Interventions: Zithromax, Augmentin, morphine 4 mg x 2, prednisone 60 mg, admission for further management    12-point ROS reviewed and found to be negative aside from aforementioned positives in HPI and/or noted in dedicated ROS section below.     ED Course (From ED Provider):  Diagnoses as of 02/12/25 2339   Pneumonia of both lungs due to infectious organism, unspecified part of lung   Admission for hospice care     Relevant Results  Results for orders placed or performed during the hospital encounter of 02/12/25 (from the past 24 hours)   Electrocardiogram, 12-lead   Result Value Ref Range    Ventricular Rate 101 BPM    Atrial Rate 101 BPM    OH Interval 151 ms    QRS Duration 78 ms    QT Interval 344 ms    QTC Calculation(Bazett) 446 ms    P Axis 80 degrees    R Axis 63 degrees    T Axis 73 degrees    QRS Count 17 beats    Q Onset 249 ms    T Offset 421 ms    QTC Fredericia 409 ms   CBC and Auto Differential   Result Value Ref Range    WBC 8.8 4.4 - 11.3 x10*3/uL    nRBC 0.0 0.0 - 0.0 /100 WBCs    RBC 4.51 4.50 - 5.90 x10*6/uL    Hemoglobin 12.5 (L) 13.5 - 17.5 g/dL    Hematocrit 39.7 (L) 41.0 - 52.0 %    MCV 88 80 - 100 fL    MCH 27.7 26.0 - 34.0 pg    MCHC 31.5 (L) 32.0 - 36.0 g/dL    RDW 12.4 11.5 - 14.5 %    Platelets 352 150 - 450 x10*3/uL    Neutrophils % 64.2 40.0 - 80.0 %    Immature Granulocytes %, Automated 0.3 0.0 - 0.9 %    Lymphocytes % 22.8 13.0 - 44.0 %    Monocytes % 9.5 2.0 - 10.0 %    Eosinophils % 2.6 0.0 - 6.0 %    Basophils % 0.6 0.0 - 2.0 %    Neutrophils Absolute 5.64 1.20 - 7.70 x10*3/uL    Immature Granulocytes Absolute, Automated 0.03 0.00 - 0.70 x10*3/uL     Lymphocytes Absolute 2.00 1.20 - 4.80 x10*3/uL    Monocytes Absolute 0.83 0.10 - 1.00 x10*3/uL    Eosinophils Absolute 0.23 0.00 - 0.70 x10*3/uL    Basophils Absolute 0.05 0.00 - 0.10 x10*3/uL   Comprehensive Metabolic Panel   Result Value Ref Range    Glucose 85 74 - 99 mg/dL    Sodium 138 136 - 145 mmol/L    Potassium 3.9 3.5 - 5.3 mmol/L    Chloride 97 (L) 98 - 107 mmol/L    Bicarbonate 36 (H) 21 - 32 mmol/L    Anion Gap 9 (L) 10 - 20 mmol/L    Urea Nitrogen 9 6 - 23 mg/dL    Creatinine 0.62 0.50 - 1.30 mg/dL    eGFR >90 >60 mL/min/1.73m*2    Calcium 8.8 8.6 - 10.3 mg/dL    Albumin 3.4 3.4 - 5.0 g/dL    Alkaline Phosphatase 77 33 - 120 U/L    Total Protein 6.3 (L) 6.4 - 8.2 g/dL    AST 9 9 - 39 U/L    Bilirubin, Total 0.4 0.0 - 1.2 mg/dL    ALT 6 (L) 10 - 52 U/L   Acute Toxicology Panel, Blood   Result Value Ref Range    Acetaminophen <10.0 10.0 - 30.0 ug/mL    Salicylate  <3 4 - 20 mg/dL    Alcohol <10 <=10 mg/dL   Sars-CoV-2 and Influenza A/B PCR   Result Value Ref Range    Flu A Result Not Detected Not Detected    Flu B Result Not Detected Not Detected    Coronavirus 2019, PCR Not Detected Not Detected   RSV PCR   Result Value Ref Range    RSV PCR Not Detected Not Detected   BLOOD GAS VENOUS FULL PANEL   Result Value Ref Range    POCT pH, Venous 7.38 7.33 - 7.43 pH    POCT pCO2, Venous 60 (H) 41 - 51 mm Hg    POCT pO2, Venous 47 (H) 35 - 45 mm Hg    POCT SO2, Venous 75 45 - 75 %    POCT Oxy Hemoglobin, Venous 73.4 45.0 - 75.0 %    POCT Hematocrit Calculated, Venous 37.0 (L) 41.0 - 52.0 %    POCT Sodium, Venous 135 (L) 136 - 145 mmol/L    POCT Potassium, Venous 4.0 3.5 - 5.3 mmol/L    POCT Chloride, Venous 99 98 - 107 mmol/L    POCT Ionized Calicum, Venous 1.16 1.10 - 1.33 mmol/L    POCT Glucose, Venous 89 74 - 99 mg/dL    POCT Lactate, Venous 0.8 0.4 - 2.0 mmol/L    POCT Base Excess, Venous 8.5 (H) -2.0 - 3.0 mmol/L    POCT HCO3 Calculated, Venous 35.5 (H) 22.0 - 26.0 mmol/L    POCT Hemoglobin, Venous 12.2 (L)  13.5 - 17.5 g/dL    POCT Anion Gap, Venous 5.0 (L) 10.0 - 25.0 mmol/L    Patient Temperature 37.0 degrees Celsius    FiO2 40 %      CT chest abdomen pelvis w IV contrast    Result Date: 2/12/2025  Interpreted By:  Tyshawn Barahona, STUDY: CT CHEST ABDOMEN PELVIS W IV CONTRAST;  2/12/2025 10:19 am   INDICATION: 61 y/o   M with  Signs/Symptoms:left abd pain, sob.     LIMITATIONS: None..   ACCESSION NUMBER(S): [id number]   ORDERING CLINICIAN: ELIZABETH ARMENDARIZ   TECHNIQUE: After the administration of IV nonionic contrast, images were obtained from the thoracic inlet down through the symphysis pubis. Sagittal and coronal reconstruction images were generated. Mediastinal, lung, bone, and liver windows were reviewed. Omnipaque 350   75 ML   COMPARISON: CT chest with contrast dated 03/18/2022.   FINDINGS: CHEST FINDINGS:   CHEST WALL/BASE OF THE NECK: The chest wall was grossly intact. No thyromegaly or thyroid mass.   LUNGS/ PLEURA/ AND TRACHEA: No pleural effusion. No pneumothorax. Mild secretions along the posterior wall of the distal trachea.. There are very mild secretions along the posterior walls of each main bronchus. There is bilateral bronchial wall thickening, most pronounced in the right middle lobe and left lingula and the lower lobes. There are some secretions in several segmental and subsegmental bronchi in the lower lobes, right greater than left. There are infiltrative densities and also reticulonodular densities all all posteriorly in both lower lobes consistent with pneumonia, right greater than left. There are emphysematous changes in the lungs. There are several scattered small nodules peripherally in the lateral and posterior right upper lobe, for example on axial image 98. These were not present previously. There are multiple tiny nodules laterally in the inferior aspect of the right upper lobe centered on axial image 184, not evident previously. There are also multiple tiny peripheral nodules laterally in  the right middle lobe, for example on image 223, not present previously. There are multiple new tiny nodules and reticulonodular densities in the superior segment of the right lower lobe. There are new tiny nodules and reticulonodular densities in the left lingula.   MEDIASTINUM/ARMIDA: Development of mild mediastinal adenopathy with lymph nodes measuring 10 mm short axis or less. Very mild bilateral hilar adenopathy. No axillary adenopathy. No cardiomegaly. No pericardial effusion. There was no aneurysmal dilatation or dissection of the thoracic aorta.   BONES: No destructive lytic or blastic bone lesion.     ABDOMEN/PELVIS FINDINGS:   LIVER: No hepatomegaly. Liver density was  within the limits of normal. There is a 6 mm cyst in segment 6 of the liver on image 122. Normal portal vein enhancement..   GALLBLADDER: No calcified stone, gallbladder wall thickening, or adjacent edema.   BILE DUCTS: No intrahepatic biliary ductal dilatation. Common bile duct was within the limits of normal.   SPLEEN: No splenomegaly. No splenic  mass.   PANCREAS: No pancreatic mass or inflammation, or ductal dilatation.   KIDNEYS/ADRENALS: No adrenal mass or enlargement. No calcified stone, hydronephrosis, mass, or perinephric edema in either kidney. No ureteral stone or dilatation.   BLADDER/PELVIS: Urinary bladder was grossly intact. No prostate enlargement.   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 Moderate aortoiliac calcifications. Otherwise, abdominal aorta and IVC were grossly intact. No suspicious retroperitoneal adenopathy. No suspicious mesenteric adenopathy. No suspicious pelvic or inguinal adenopathy.   PERITONEUM: No ascites. No pneumoperitoneum. No peritoneal or mesenteric mass or inflammation.   BOWEL: The stomach was  grossly intact. There was no small bowel dilatation or small bowel wall thickening. No small-bowel  obstruction. Mild scattered retained colonic stool and gas. There was no colonic wall thickening or large bowel obstruction. No edema adjacent to the colon. The cecal appendix was surgically absent..   BONES: No destructive lytic or blastic bone lesion. Bilateral L4 pars defects. Grade 1 anterolisthesis of L4 over L5. Mild-to-moderate disc space narrowing with endplate spurring at that level. Inter facet hypertrophic arthritic changes at L5-S1 with mild disc space narrowing at that level. There is mild endplate osteophytosis at L1-2 and L2-3. Slight disc space narrowing at L1-2. Incidental note of partial sacralization of the left L5 transverse process. Mild sclerotic arthritic changes in both SI joints.   ABDOMINAL WALL: Unremarkable.       CHEST: Mild new mediastinal adenopathy and very mild bilateral hilar adenopathy, most likely infectious/inflammatory.   Mild secretions in the trachea and main bronchi as described, in addition to secretions evident in several segmental/subsegmental bronchi in the lower lobes. In addition, there is bronchial wall thickening in both lower lobes and to a lesser extent the right middle lobe and left lingula consistent with acute bronchitis. Finally, there are areas of reticulonodular and more confluent infiltrative density in the lower lobes as described, right greater than left, consistent with pneumonia. There are also scattered new areas of tiny nodules and reticulonodular densities in the right upper lobe and right middle lobe, superior segment of the right lower lobe, and in the left lingula consistent with acute bronchiolitis.   Underlying emphysema.     ABDOMEN/PELVIS: Arthritic changes in the lumbosacral spine as described. No CT evidence of fracture of the lumbar spine or pelvis in this exam.   Aortoiliac calcifications. 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 Please see above for details.   Small right lobe liver cyst.   Previous appendectomy.   MACRO:  None   Signed by: Tyshawn Barahona 2/12/2025 10:46 AM Dictation workstation:   [id number]    Electrocardiogram, 12-lead    Result Date: 2/12/2025  Sinus tachycardia Biatrial enlargement ST elevation, consider inferior injury See ED provider note for full interpretation and clinical correlation Confirmed by Sandy Valles ([id number]) on 2/12/2025 9:56:47 AM    XR chest 1 view    Result Date: 2/12/2025  Interpreted By:  Yan Almaraz, STUDY: XR CHEST 1 VIEW   INDICATION: Signs/Symptoms:sob.   COMPARISON: March 18, 2022   ACCESSION NUMBER(S): [id number]   ORDERING CLINICIAN: ELIZABETH ARMENDARIZ   FINDINGS: 1.2 cm suspected right lower lobe pulmonary nodule new from prior study. This raises concern for possible neoplasm.   COPD changes have worsened in the interval with more flattening of the diaphragms and more coarse interstitial markings.   No consolidation, effusion, edema, pneumothorax.       New 1.2 cm pulmonary nodule raising concern for potential neoplasm. CT of the thorax recommended for further evaluation.   Worsening COPD.   MACRO: Critical Finding:  See findings. Notification was initiated on 2/12/2025 at 9:45 am by  Yan Almaraz.  (**-YCF-**) Instructions:   Signed by: Yan Almaraz 2/12/2025 9:47 AM Dictation workstation:   [id number]    Scheduled medications:  [START ON 2/13/2025] azithromycin, 500 mg, intravenous, q24h  cefTRIAXone, 2 g, intravenous, q24h  [START ON 2/13/2025] ipratropium-albuteroL, 3 mL, nebulization, q6h  [START ON 2/13/2025] pantoprazole, 40 mg, oral, Daily before breakfast   Or  [START ON 2/13/2025] pantoprazole, 40 mg, intravenous, Daily before breakfast      Continuous medications:     PRN medications:  PRN medications: acetaminophen, melatonin, ondansetron, polyethylene glycol     Past Medical History  He has a past medical history of COPD (chronic obstructive pulmonary disease) (Multi) and Myocardial infarction (Multi).    Surgical History  He has no past surgical history on file.     Social  History  He reports that he has been smoking cigarettes. He has never used smokeless tobacco. No history on file for alcohol use and drug use.    Family History  No family history on file.    Allergies  Patient has no known allergies.    Code Status  Full Code     Review of Systems   Constitutional:  Positive for fatigue. Negative for chills and fever.   Respiratory:  Positive for cough and shortness of breath. Negative for chest tightness and wheezing.    Cardiovascular:  Negative for chest pain, palpitations and leg swelling.   Gastrointestinal:  Negative for abdominal pain, constipation, diarrhea, nausea and vomiting.   Genitourinary:  Negative for flank pain and hematuria.   Musculoskeletal:  Negative for back pain and joint swelling.   Skin:  Negative for rash and wound.   Neurological:  Positive for headaches. Negative for tremors, syncope and weakness.   Psychiatric/Behavioral:  Positive for suicidal ideas.        Last Recorded Vitals  BP (!) 133/91   Pulse (!) 103   Temp 36.8 °C (98.3 °F)   Resp 13   Wt 57.6 kg (127 lb)   SpO2 100%      Physical Exam:  Vital signs and nursing notes reviewed.   Constitutional: Pleasant and cooperative. Laying in bed in no acute distress. Conversant.   Skin: Warm and dry; no obvious lesions, rashes, pallor, or jaundice.   Eyes: EOMI. Anicteric sclera.   ENT: Mucous membranes moist; no obvious injury or deformity appreciated.   Head and Neck: Normocephalic, atraumatic. ROM preserved. Trachea midline. No appreciable JVD.   Respiratory: Nonlabored on 5L NC. Lungs sound diminished bilaterally without obvious adventitious sounds. Chest rise is equal.  Cardiovascular: RRR. No gross murmur, gallop, or rub. Extremities are warm and well-perfused with good capillary refill (< 3 seconds). No chest wall tenderness.   GI: Abdomen soft, tender with minimal palpation diffuse throughout, nondistended. No obvious organomegaly appreciated. Bowel sounds are present.  : No CVA tenderness.    MSK: No gross abnormalities appreciated. No limitations to AROM/PROM appreciated.   Extremities: No cyanosis, edema, or clubbing evident. Neurovascularly intact.   Neuro: A&Ox3. CN 2-12 grossly intact. Able to respond to questions appropriately and clearly. No acute focal neurologic deficits appreciated.  Psych: Depressed.     Assessment/Plan     60 y.o. male with PMHx s/f stage IV severe COPD, chronic hypoxic hypercapnic respiratory failure on 5 L nasal cannula, cigarette dependence, CAD, HTN, HLD, HFrEF, seizure in 2016, DAV presenting with worsening shortness of breath.    Pneumonia of bilateral lower lobes  -Continue antibiotic coverage with Rocephin, doxycycline  -Send urine antigens.  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 on CT  -Restarted on home Lipitor  -Vascular surgery consult    Anxiety and depression  Passive suicidal ideation  -EPAT consult appreciated; he does not meet inpatient psychiatric admission but lacks the capacity to leave AMA at this time  -Restarted home Lexapro  -Social work consult for hospice    CAD, HTN, HLD, HFrEF  -Restarted on home aspirin, Lipitor, Toprol XL    Cigarette dependence  -Currently still smoking 1 to 2 cigarettes/day  -NRT offered and declined at this time    Diet: Regular  DVT Prophylaxis: Low risk score, ambulate if able  Code Status: Full code, incomplete discussion-patient lacks capacity  Case Discussed With: ED provider  Additional Sources Reviewed: Pulmonology    Anticipated Length of Stay (LOS): Patient will require 2+ midnights     Mansi Westfall PA-C    Dragon dictation software was used to dictate this note and thus  there may be minor errors in translation/transcription including garbled speech or misspellings. Please contact for clarification if needed.

## 2025-02-13 NOTE — PROGRESS NOTES
Ino Pierce is a 60 y.o. male admitted for Pneumonia of both lungs due to infectious organism, unspecified part of lung. Pharmacy reviewed the patient's [id number] medications and allergies for accuracy.    The list below reflects the PTA list prior to pharmacy medication history. A summary a changes to the PTA medication list has been listed below. Please review each medication in order reconciliation for additional clarification and justification.    Source of information: t2p     Medications added:  Ativan 0.5mg- 1 bid   Morphine ir 15mg- 1 t q 6 h prn pain   Lyssa-colace 8.6-50mg    Medications modified:    Medications to be removed:  Aspirin 81mg  Lipitor 80mg   Metoprolol succinate 50mg     Medications of concern:  Lexapro 20mg- last filled 8/9 [id number]  Daliresp 500mcg -last filled 12/15 x30days      Prior to Admission Medications   Prescriptions Last Dose Informant Patient Reported? Taking?   Breztri Aerosphere 160-9-4.8 mcg/actuation HFA aerosol inhaler   Yes No   Sig: Inhale 2 puffs 2 times a day.   Symbicort 160-4.5 mcg/actuation inhaler   Yes No   Sig: Inhale 2 puffs 2 times a day.   albuterol 90 mcg/actuation inhaler   Yes Yes   Sig: Inhale 2 puffs every 6 hours if needed.   aspirin 81 mg EC tablet   Yes No   Sig: Take 1 tablet (81 mg) by mouth once daily.   atorvastatin (Lipitor) 80 mg tablet   Yes Yes   Sig: Take 1 tablet (80 mg) by mouth once daily.   escitalopram (Lexapro) 20 mg tablet   Yes No   Sig: Take 1 tablet (20 mg) by mouth once daily.   metoprolol succinate XL (Toprol-XL) 50 mg 24 hr tablet   Yes No   Sig: Take 1 tablet (50 mg) by mouth once daily.   roflumilast (Daliresp) 500 mcg tablet   Yes No   Sig: Take 1 tablet (500 mcg) by mouth once daily.      Facility-Administered Medications: None       CHERELLE LYON

## 2025-02-14 LAB
ANION GAP SERPL CALC-SCNC: 12 MMOL/L (ref 10–20)
BASOPHILS # BLD AUTO: 0.04 X10*3/UL (ref 0–0.1)
BASOPHILS NFR BLD AUTO: 0.4 %
BUN SERPL-MCNC: 22 MG/DL (ref 6–23)
CALCIUM SERPL-MCNC: 8.2 MG/DL (ref 8.6–10.3)
CHLORIDE SERPL-SCNC: 100 MMOL/L (ref 98–107)
CO2 SERPL-SCNC: 30 MMOL/L (ref 21–32)
CREAT SERPL-MCNC: 0.85 MG/DL (ref 0.5–1.3)
EGFRCR SERPLBLD CKD-EPI 2021: >90 ML/MIN/1.73M*2
EOSINOPHIL # BLD AUTO: 0.04 X10*3/UL (ref 0–0.7)
EOSINOPHIL NFR BLD AUTO: 0.4 %
ERYTHROCYTE [DISTWIDTH] IN BLOOD BY AUTOMATED COUNT: 12.3 % (ref 11.5–14.5)
GLUCOSE SERPL-MCNC: 95 MG/DL (ref 74–99)
HCT VFR BLD AUTO: 36.7 % (ref 41–52)
HGB BLD-MCNC: 11.9 G/DL (ref 13.5–17.5)
IMM GRANULOCYTES # BLD AUTO: 0.05 X10*3/UL (ref 0–0.7)
IMM GRANULOCYTES NFR BLD AUTO: 0.5 % (ref 0–0.9)
LYMPHOCYTES # BLD AUTO: 1.58 X10*3/UL (ref 1.2–4.8)
LYMPHOCYTES NFR BLD AUTO: 14.4 %
MCH RBC QN AUTO: 28 PG (ref 26–34)
MCHC RBC AUTO-ENTMCNC: 32.4 G/DL (ref 32–36)
MCV RBC AUTO: 86 FL (ref 80–100)
MONOCYTES # BLD AUTO: 0.65 X10*3/UL (ref 0.1–1)
MONOCYTES NFR BLD AUTO: 5.9 %
NEUTROPHILS # BLD AUTO: 8.59 X10*3/UL (ref 1.2–7.7)
NEUTROPHILS NFR BLD AUTO: 78.4 %
NRBC BLD-RTO: 0 /100 WBCS (ref 0–0)
PLATELET # BLD AUTO: 378 X10*3/UL (ref 150–450)
POTASSIUM SERPL-SCNC: 3.7 MMOL/L (ref 3.5–5.3)
RBC # BLD AUTO: 4.25 X10*6/UL (ref 4.5–5.9)
SODIUM SERPL-SCNC: 138 MMOL/L (ref 136–145)
WBC # BLD AUTO: 11 X10*3/UL (ref 4.4–11.3)

## 2025-02-14 PROCEDURE — 2500000005 HC RX 250 GENERAL PHARMACY W/O HCPCS

## 2025-02-14 PROCEDURE — 82374 ASSAY BLOOD CARBON DIOXIDE: CPT | Performed by: INTERNAL MEDICINE

## 2025-02-14 PROCEDURE — 2500000001 HC RX 250 WO HCPCS SELF ADMINISTERED DRUGS (ALT 637 FOR MEDICARE OP)

## 2025-02-14 PROCEDURE — 94668 MNPJ CHEST WALL SBSQ: CPT

## 2025-02-14 PROCEDURE — 1210000001 HC SEMI-PRIVATE ROOM DAILY

## 2025-02-14 PROCEDURE — 2500000002 HC RX 250 W HCPCS SELF ADMINISTERED DRUGS (ALT 637 FOR MEDICARE OP, ALT 636 FOR OP/ED)

## 2025-02-14 PROCEDURE — 2500000001 HC RX 250 WO HCPCS SELF ADMINISTERED DRUGS (ALT 637 FOR MEDICARE OP): Performed by: INTERNAL MEDICINE

## 2025-02-14 PROCEDURE — 97161 PT EVAL LOW COMPLEX 20 MIN: CPT | Mod: GP

## 2025-02-14 PROCEDURE — 2500000001 HC RX 250 WO HCPCS SELF ADMINISTERED DRUGS (ALT 637 FOR MEDICARE OP): Performed by: REGISTERED NURSE

## 2025-02-14 PROCEDURE — G0426 INPT/ED TELECONSULT50: HCPCS | Performed by: PSYCHIATRY & NEUROLOGY

## 2025-02-14 PROCEDURE — 2500000004 HC RX 250 GENERAL PHARMACY W/ HCPCS (ALT 636 FOR OP/ED)

## 2025-02-14 PROCEDURE — 94640 AIRWAY INHALATION TREATMENT: CPT

## 2025-02-14 PROCEDURE — 97165 OT EVAL LOW COMPLEX 30 MIN: CPT | Mod: GO

## 2025-02-14 PROCEDURE — 36415 COLL VENOUS BLD VENIPUNCTURE: CPT | Performed by: INTERNAL MEDICINE

## 2025-02-14 PROCEDURE — 85025 COMPLETE CBC W/AUTO DIFF WBC: CPT | Performed by: INTERNAL MEDICINE

## 2025-02-14 PROCEDURE — 99233 SBSQ HOSP IP/OBS HIGH 50: CPT | Performed by: INTERNAL MEDICINE

## 2025-02-14 RX ADMIN — PANTOPRAZOLE SODIUM 40 MG: 40 TABLET, DELAYED RELEASE ORAL at 06:24

## 2025-02-14 RX ADMIN — TIOTROPIUM BROMIDE INHALATION SPRAY 2 PUFF: 3.12 SPRAY, METERED RESPIRATORY (INHALATION) at 07:27

## 2025-02-14 RX ADMIN — ATORVASTATIN CALCIUM 80 MG: 40 TABLET, FILM COATED ORAL at 07:27

## 2025-02-14 RX ADMIN — MORPHINE SULFATE 15 MG: 15 TABLET ORAL at 10:36

## 2025-02-14 RX ADMIN — FLUTICASONE FUROATE AND VILANTEROL TRIFENATATE 1 PUFF: 200; 25 POWDER RESPIRATORY (INHALATION) at 07:27

## 2025-02-14 RX ADMIN — MORPHINE SULFATE 15 MG: 15 TABLET ORAL at 16:31

## 2025-02-14 RX ADMIN — LORAZEPAM 0.5 MG: 0.5 TABLET ORAL at 07:27

## 2025-02-14 RX ADMIN — IPRATROPIUM BROMIDE AND ALBUTEROL SULFATE 3 ML: 2.5; .5 SOLUTION RESPIRATORY (INHALATION) at 15:29

## 2025-02-14 RX ADMIN — Medication 3 MG: at 20:13

## 2025-02-14 RX ADMIN — MORPHINE SULFATE 15 MG: 15 TABLET ORAL at 04:32

## 2025-02-14 RX ADMIN — ESCITALOPRAM OXALATE 20 MG: 10 TABLET ORAL at 07:27

## 2025-02-14 RX ADMIN — AZITHROMYCIN MONOHYDRATE 500 MG: 500 INJECTION, POWDER, LYOPHILIZED, FOR SOLUTION INTRAVENOUS at 10:11

## 2025-02-14 RX ADMIN — ASPIRIN 81 MG: 81 TABLET, COATED ORAL at 07:27

## 2025-02-14 RX ADMIN — IPRATROPIUM BROMIDE AND ALBUTEROL SULFATE 3 ML: 2.5; .5 SOLUTION RESPIRATORY (INHALATION) at 11:42

## 2025-02-14 RX ADMIN — METOPROLOL SUCCINATE 50 MG: 50 TABLET, EXTENDED RELEASE ORAL at 07:27

## 2025-02-14 RX ADMIN — LORAZEPAM 0.5 MG: 0.5 TABLET ORAL at 16:33

## 2025-02-14 ASSESSMENT — PAIN SCALES - GENERAL
PAINLEVEL_OUTOF10: 6
PAINLEVEL_OUTOF10: 9
PAINLEVEL_OUTOF10: 5 - MODERATE PAIN
PAINLEVEL_OUTOF10: 3
PAINLEVEL_OUTOF10: 9
PAINLEVEL_OUTOF10: 8
PAINLEVEL_OUTOF10: 9
PAINLEVEL_OUTOF10: 4
PAINLEVEL_OUTOF10: 9

## 2025-02-14 ASSESSMENT — ENCOUNTER SYMPTOMS
NEUROLOGICAL NEGATIVE: 1
WEAKNESS: 1
SHORTNESS OF BREATH: 1
EYES NEGATIVE: 1
GASTROINTESTINAL NEGATIVE: 1
ALLERGIC/IMMUNOLOGIC NEGATIVE: 1
BACK PAIN: 1
MUSCULOSKELETAL NEGATIVE: 1
FATIGUE: 1
CONSTITUTIONAL NEGATIVE: 1
CONFUSION: 0
HEMATOLOGIC/LYMPHATIC NEGATIVE: 1
DYSPHORIC MOOD: 1
ENDOCRINE NEGATIVE: 1

## 2025-02-14 ASSESSMENT — COGNITIVE AND FUNCTIONAL STATUS - GENERAL
CLIMB 3 TO 5 STEPS WITH RAILING: A LOT
DRESSING REGULAR LOWER BODY CLOTHING: A LOT
WALKING IN HOSPITAL ROOM: A LITTLE
PERSONAL GROOMING: A LITTLE
WALKING IN HOSPITAL ROOM: A LOT
HELP NEEDED FOR BATHING: A LITTLE
MOBILITY SCORE: 18
HELP NEEDED FOR BATHING: A LOT
TOILETING: A LOT
PERSONAL GROOMING: A LITTLE
DRESSING REGULAR LOWER BODY CLOTHING: A LITTLE
MOBILITY SCORE: 17
DRESSING REGULAR UPPER BODY CLOTHING: A LITTLE
WALKING IN HOSPITAL ROOM: A LOT
DAILY ACTIVITIY SCORE: 18
DAILY ACTIVITIY SCORE: 16
DRESSING REGULAR UPPER BODY CLOTHING: A LITTLE
HELP NEEDED FOR BATHING: A LOT
STANDING UP FROM CHAIR USING ARMS: A LITTLE
MOVING TO AND FROM BED TO CHAIR: A LITTLE
DRESSING REGULAR LOWER BODY CLOTHING: A LITTLE
MOBILITY SCORE: 20
DRESSING REGULAR UPPER BODY CLOTHING: A LITTLE
CLIMB 3 TO 5 STEPS WITH RAILING: TOTAL
DAILY ACTIVITIY SCORE: 19
TOILETING: A LITTLE
MOVING TO AND FROM BED TO CHAIR: A LITTLE
STANDING UP FROM CHAIR USING ARMS: A LITTLE
CLIMB 3 TO 5 STEPS WITH RAILING: TOTAL
TOILETING: A LOT

## 2025-02-14 ASSESSMENT — ACTIVITIES OF DAILY LIVING (ADL)
ADL_ASSISTANCE: INDEPENDENT
ADL_ASSISTANCE: INDEPENDENT
BATHING_ASSISTANCE: MINIMAL

## 2025-02-14 ASSESSMENT — PAIN DESCRIPTION - ORIENTATION
ORIENTATION: LOWER

## 2025-02-14 ASSESSMENT — PAIN DESCRIPTION - LOCATION
LOCATION: BACK

## 2025-02-14 ASSESSMENT — PAIN - FUNCTIONAL ASSESSMENT
PAIN_FUNCTIONAL_ASSESSMENT: 0-10

## 2025-02-14 NOTE — PROGRESS NOTES
Ino Pierce is a 60 y.o. male on day 1 of admission presenting with Pneumonia of both lungs due to infectious organism, unspecified part of lung.      Subjective   Ino Pierce is a 60 y.o. male with PMHx s/f stage IV severe COPD, chronic hypoxic hypercapnic respiratory failure on 5 L nasal cannula, cigarette dependence, CAD, HTN, HLD, HFrEF, seizure in 2016, DAV presenting with worsening shortness of breath.  Has associated headache, productive cough and generalized malaise for past few days.  He states that he is following with palliative care at [facility] for his COPD but was kicked out of the program because he could not make it to the appointments.  He notes he has been having difficulty this year and is emotional exhausted as there were four family members and two pets passed away, all in this year. He got kicked out of his house and is currently living with his nephew. He has no active suicide ideation nor plan of suicide. He is not taking any medications, has been off of his inhalers for a while. Does not know what medications he was on.  Denies lightheadedness, fever chills nausea vomiting, chest pain, leg swelling.  He states almost everyone is sick at his place right now.      ED Course (Summary - please note all labs, imaging studies, and interventions noted below have been personally reviewed and/or interpreted on day of admission):   Vitals on presentation: 98.3 F, 105 bpm, 20 RR, 144/91, 90% on 5 L nasal cannula  Labs: CMP, CBC, VBG largely unremarkable  Viral studies negative  EKG: Sinus tachycardia at 101 bpm, no acute STEMI  Imaging: CXR-new 1.2 cm pulmonary nodule concerning for potential neoplasm  CT chest AP-mild new mediastinal adenopathy and bilateral hilar adenopathy.  Mild secretion in trachea and main bronchi, bronchial wall thickening in both lower lobes consistent with acute bronchitis.  Areas of reticular nodule and infiltrate density in lower lobes consistent with Imodium.  Scattered  new area of tiny nodule and reticular nodular density in right upper and middle lobe consistent with acute bronchiolitis.  Underlying emphysema.  Aortoiliac calcifications.  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  Small right lobe liver cyst.  Previous appendectomy.  Interventions: Zithromax, Augmentin, morphine 4 mg x 2, prednisone 60 mg, admission for further management  2/13: Patient was seen and examined.  Saturating well on 6 5 L nasal cannula oxygen which is his baseline.  Continues to complain of shortness of breath.  Was also complaining of pain.  Urine Legionella and strep antigen are negative.  Will continue current antibiotics breathing treatment and steroids.  Palliative care consulted.  2/14: Patient was seen and examined.  Saturating well on 5 L nasal cannula oxygen.  Patient is admitting to home with home hospice.  Palliative care and hospice to see later today.  Psychiatry consulted will get to see.  Patient is medically stable for inpatient psychiatric evaluation/placement.    Objective     Last Recorded Vitals  /82 (BP Location: Left arm, Patient Position: Lying)   Pulse 69   Temp 36.4 °C (97.6 °F) (Temporal)   Resp 18   Wt 52.3 kg (115 lb 4.8 oz)   SpO2 99%   Intake/Output last 3 Shifts:    Intake/Output Summary (Last 24 hours) at 2/14/2025 1145  Last data filed at 2/14/2025 0900  Gross per 24 hour   Intake 370 ml   Output 275 ml   Net 95 ml       Admission Weight  Weight: 57.6 kg (127 lb) (02/12/25 0832)    Daily Weight  02/13/25 : 52.3 kg (115 lb 4.8 oz)    Image Results  CT chest abdomen pelvis w IV contrast  Narrative: Interpreted By:  Tyshawn Barahona,   STUDY:  CT CHEST ABDOMEN PELVIS W IV CONTRAST;  2/12/2025 10:19 am      INDICATION:  59 y/o   M with  Signs/Symptoms:left abd pain, sob.          LIMITATIONS:  None..      ACCESSION NUMBER(S):  [id number]      ORDERING CLINICIAN:  ELIZABETH ARMENDARIZ      TECHNIQUE:  After the administration of IV nonionic contrast, images were  obtained  from the thoracic inlet down through the symphysis pubis.  Sagittal and coronal reconstruction images were generated.  Mediastinal, lung, bone, and liver windows were reviewed. Omnipaque  350   75 ML      COMPARISON:  CT chest with contrast dated 03/18/2022.      FINDINGS:  CHEST FINDINGS:      CHEST WALL/BASE OF THE NECK:  The chest wall was grossly intact.  No thyromegaly or thyroid mass.      LUNGS/ PLEURA/ AND TRACHEA:  No pleural effusion. No pneumothorax.  Mild secretions along the posterior wall of the distal trachea..  There are very mild secretions along the posterior walls of each main  bronchus. There is bilateral bronchial wall thickening, most  pronounced in the right middle lobe and left lingula and the lower  lobes. There are some secretions in several segmental and  subsegmental bronchi in the lower lobes, right greater than left.  There are infiltrative densities and also reticulonodular densities  all all posteriorly in both lower lobes consistent with pneumonia,  right greater than left. There are emphysematous changes in the  lungs. There are several scattered small nodules peripherally in the  lateral and posterior right upper lobe, for example on axial image  98. These were not present previously. There are multiple tiny  nodules laterally in the inferior aspect of the right upper lobe  centered on axial image 184, not evident previously. There are also  multiple tiny peripheral nodules laterally in the right middle lobe,  for example on image 223, not present previously. There are multiple  new tiny nodules and reticulonodular densities in the superior  segment of the right lower lobe. There are new tiny nodules and  reticulonodular densities in the left lingula.      MEDIASTINUM/ARMIDA:  Development of mild mediastinal adenopathy with lymph nodes measuring  10 mm short axis or less. Very mild bilateral hilar adenopathy. No  axillary adenopathy. No cardiomegaly.  No pericardial effusion.  There  was no aneurysmal dilatation or dissection of the thoracic  aorta.      BONES:  No destructive lytic or blastic bone lesion.          ABDOMEN/PELVIS FINDINGS:      LIVER:  No hepatomegaly.  Liver density was  within the limits of normal.  There is a 6 mm cyst in segment 6 of the liver on image 122. Normal  portal vein enhancement..      GALLBLADDER:  No calcified stone, gallbladder wall thickening, or adjacent edema.      BILE DUCTS:  No intrahepatic biliary ductal dilatation.  Common bile duct was within the limits of normal.      SPLEEN:  No splenomegaly.  No splenic  mass.      PANCREAS:  No pancreatic mass or inflammation, or ductal dilatation.      KIDNEYS/ADRENALS:  No adrenal mass or enlargement.  No calcified stone, hydronephrosis, mass, or perinephric edema in  either kidney. No ureteral stone or dilatation.      BLADDER/PELVIS:  Urinary bladder was grossly intact.  No prostate enlargement.      GREAT VESSELS/RETROPERITONEUM:  There is severe stenosis of the proximal to mid SMA caused by mural  calcification and posterior wall mural thrombus. Moderate aortoiliac  calcifications. Otherwise, abdominal aorta and IVC were grossly  intact. No suspicious retroperitoneal adenopathy.  No suspicious mesenteric adenopathy.  No suspicious pelvic or inguinal adenopathy.      PERITONEUM:  No ascites.  No pneumoperitoneum.  No peritoneal or mesenteric mass or inflammation.      BOWEL:  The stomach was  grossly intact.  There was no small bowel dilatation or small bowel wall thickening.  No small-bowel obstruction. Mild scattered retained colonic stool and  gas. There was no colonic wall thickening or large bowel obstruction.  No edema adjacent to the colon. The cecal appendix was surgically  absent..      BONES:  No destructive lytic or blastic bone lesion. Bilateral L4 pars  defects. Grade 1 anterolisthesis of L4 over L5. Mild-to-moderate disc  space narrowing with endplate spurring at that level. Inter  facet  hypertrophic arthritic changes at L5-S1 with mild disc space  narrowing at that level. There is mild endplate osteophytosis at L1-2  and L2-3. Slight disc space narrowing at L1-2. Incidental note of  partial sacralization of the left L5 transverse process. Mild  sclerotic arthritic changes in both SI joints.      ABDOMINAL WALL:  Unremarkable.      Impression: CHEST:  Mild new mediastinal adenopathy and very mild bilateral hilar  adenopathy, most likely infectious/inflammatory.      Mild secretions in the trachea and main bronchi as described, in  addition to secretions evident in several segmental/subsegmental  bronchi in the lower lobes. In addition, there is bronchial wall  thickening in both lower lobes and to a lesser extent the right  middle lobe and left lingula consistent with acute bronchitis.  Finally, there are areas of reticulonodular and more confluent  infiltrative density in the lower lobes as described, right greater  than left, consistent with pneumonia. There are also scattered new  areas of tiny nodules and reticulonodular densities in the right  upper lobe and right middle lobe, superior segment of the right lower  lobe, and in the left lingula consistent with acute bronchiolitis.      Underlying emphysema.          ABDOMEN/PELVIS:  Arthritic changes in the lumbosacral spine as described. No CT  evidence of fracture of the lumbar spine or pelvis in this exam.      Aortoiliac calcifications. Severe stenosis of the proximal to mid SMA  as described. Please see above for details.      Small right lobe liver cyst.      Previous appendectomy.      MACRO:  None      Signed by: Tyshawn Barahona 2/12/2025 10:46 AM  Dictation workstation:   [id number]  Electrocardiogram, 12-lead  Sinus tachycardia  Biatrial enlargement  ST elevation, consider inferior injury    See ED provider note for full interpretation and clinical correlation  Confirmed by Sandy Valles ([id number]) on 2/12/2025 9:56:47 AM  XR  chest 1 view  Narrative: Interpreted By:  Yan Almaraz,   STUDY:  XR CHEST 1 VIEW      INDICATION:  Signs/Symptoms:sob.      COMPARISON:  March 18, 2022      ACCESSION NUMBER(S):  [id number]      ORDERING CLINICIAN:  ELIZABETH ARMENDARIZ      FINDINGS:  1.2 cm suspected right lower lobe pulmonary nodule new from prior  study. This raises concern for possible neoplasm.      COPD changes have worsened in the interval with more flattening of  the diaphragms and more coarse interstitial markings.      No consolidation, effusion, edema, pneumothorax.      Impression: New 1.2 cm pulmonary nodule raising concern for potential neoplasm.  CT of the thorax recommended for further evaluation.      Worsening COPD.      MACRO:  Critical Finding:  See findings. Notification was initiated on  2/12/2025 at 9:45 am by  Yan Almaraz.  (**-YCF-**) Instructions:      Signed by: Yan Almaraz 2/12/2025 9:47 AM  Dictation workstation:   [id number]      Physical Exam  Vitals:    02/14/25 1142   BP:    Pulse:    Resp: 18   Temp:    SpO2: 99%     Constitutional: Pleasant and cooperative. Laying in bed in no acute distress. Conversant.   Skin: Warm and dry; no obvious lesions, rashes, pallor, or jaundice.   Eyes: EOMI. Anicteric sclera.   ENT: Mucous membranes moist; no obvious injury or deformity appreciated.   Head and Neck: Normocephalic, atraumatic. ROM preserved. Trachea midline. No appreciable JVD.   Respiratory: Nonlabored on 5L NC. Lungs sound diminished bilaterally without obvious adventitious sounds. Chest rise is equal.  Cardiovascular: RRR. No gross murmur, gallop, or rub. Extremities are warm and well-perfused with good capillary refill (< 3 seconds). No chest wall tenderness.   GI: Abdomen soft, tender with minimal palpation diffuse throughout, nondistended. No obvious organomegaly appreciated. Bowel sounds are present.  : No CVA tenderness.   MSK: No gross abnormalities appreciated. No limitations to AROM/PROM appreciated.    Extremities: No cyanosis, edema, or clubbing evident. Neurovascularly intact.   Neuro: A&Ox3. CN 2-12 grossly intact. Able to respond to questions appropriately and clearly. No acute focal neurologic deficits appreciated.  Psych: Depressed.   Relevant Results               Assessment/Plan                  Assessment & Plan  Pneumonia of both lungs due to infectious organism, unspecified part of lung    60 y.o. male with PMHx s/f stage IV severe COPD, chronic hypoxic hypercapnic respiratory failure on 5 L nasal cannula, cigarette dependence, CAD, HTN, HLD, HFrEF, seizure in 2016, DAV presenting with worsening shortness of breath.     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  -Restarted home Lexapro  -Psych evaluation  -Social work consult for hospice       CAD, HTN, HLD, HFrEF  -Restarted on home aspirin, Lipitor, Toprol XL     Cigarette dependence  -Currently still smoking 1 to 2 cigarettes/day  -NRT offered and declined at this time     Diet: Regular  DVT Prophylaxis: Low risk score, ambulate if able  Code Status: Full code,  incomplete discussion-patient lacks capacity  Case Discussed With: ED provider  Additional Sources Reviewed: Pulmonology     Anticipated Length of Stay (LOS): Patient will require 2+ midnights          Malnutrition Diagnosis Status: New  Malnutrition Diagnosis: Severe malnutrition related to chronic disease or condition  Related to: COPD metabolism  As Evidenced by: moderate subcutaneous fat, muscle loss per NFPE, >10% weight loss x 6 months  I agree with the dietitian's malnutrition diagnosis.         Spent 35 minutes in the follow-up management of this patient today    Mariama Chacko MD

## 2025-02-14 NOTE — CONSULTS
"Inpatient consult to Psychiatry  Consult performed by: Oscar Lund MD  Consult ordered by: Mariama Chacko MD  Reason for consult: \"Psych med evaluation\"          Date: 02-    Reason For Consult: \"Psych med evaluation\"      Chief Complaint: \"I fell down.\"    History Of Present Illness  Ino Pierce is a 60 y.o. year old male patient who presented to the Emergency Department who presented with worsening shortness of breath (see hospitalist's note below). On telepsychiatry evaluation today (which patient was consented), Ino reports experiencing feelings of depression for the past one year (due to many family members dying), along with decreased sleep, decreased energy, decreased concentration, increased feelings of hopelessness and helplessness and worthlessness, and anhedonia, all for about the past year. He denies experiencing any suicidal thoughts, suicide plans, or intention of suicide.He also denies experiencing any prior depressive episodes, or manic symptoms, in the past. No hallucinations or paranoia were endorsed or noted.        Ino reports experiencing excessive worries about everyday activities that he can't control, and result in problems sleeping, concentrating, decreased energy, irritability, and restlessness.           Per hospitalist's note of 02-:  Ino Pierce is a 60 y.o. male with PMHx s/f stage IV severe COPD, chronic hypoxic hypercapnic respiratory failure on 5 L nasal cannula, cigarette dependence, CAD, HTN, HLD, HFrEF, seizure in 2016, DAV presenting with worsening shortness of breath.  Has associated headache, productive cough and generalized malaise for past few days.  He states that he is following with palliative care at [facility] for his COPD but was kicked out of the program because he could not make it to the appointments.  He notes he has been having difficulty this year and is emotional exhausted as there were four family members and two pets passed " away, all in this year. He got kicked out of his house and is currently living with his nephew. He has no active suicide ideation nor plan of suicide. He is not taking any medications, has been off of his inhalers for a while. Does not know what medications he was on.  Denies lightheadedness, fever chills nausea vomiting, chest pain, leg swelling.  He states almost everyone is sick at his place right now.      ED Course (Summary - please note all labs, imaging studies, and interventions noted below have been personally reviewed and/or interpreted on day of admission):   Vitals on presentation: 98.3 F, 105 bpm, 20 RR, 144/91, 90% on 5 L nasal cannula  Labs: CMP, CBC, VBG largely unremarkable  Viral studies negative  EKG: Sinus tachycardia at 101 bpm, no acute STEMI  Imaging: CXR-new 1.2 cm pulmonary nodule concerning for potential neoplasm  CT chest AP-mild new mediastinal adenopathy and bilateral hilar adenopathy.  Mild secretion in trachea and main bronchi, bronchial wall thickening in both lower lobes consistent with acute bronchitis.  Areas of reticular nodule and infiltrate density in lower lobes consistent with Imodium.  Scattered new area of tiny nodule and reticular nodular density in right upper and middle lobe consistent with acute bronchiolitis.  Underlying emphysema.  Aortoiliac calcifications.  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  Small right lobe liver cyst.  Previous appendectomy.  Interventions: Zithromax, Augmentin, morphine 4 mg x 2, prednisone 60 mg, admission for further management  2/13: Patient was seen and examined.  Saturating well on 6 5 L nasal cannula oxygen which is his baseline.  Continues to complain of shortness of breath.  Was also complaining of pain.   Urine Legionella and strep antigen are negative.  Will continue current antibiotics breathing treatment and steroids.  Palliative care consulted.  2/14: Patient was seen and examined.  Saturating well on 5 L nasal cannula oxygen.   "Patient is admitting to home with home hospice.  Palliative care and hospice to see later today.  Psychiatry consulted will get to see.  Patient is medically stable for inpatient psychiatric evaluation/placement.           PSYCHIATRIC REVIEW OF SYMPTOMS  Depressive Symptoms: depressed or irritable mood, diminished interest, insomnia or hypersomnia, fatigue or loss of energy, worthlessness or guilt, and poor concentration or indecisiveness  Manic Symptoms: negative  Anxiety Symptoms: excessive worry Worry Symptoms: difficulty concentrating due to worry, difficulty controlling worry, easily fatigued due to worry, irritability due to worry, restlessness or feeling on edge due to worry, and sleep disturbances due to worry  Psychotic Symptoms:  None  Delirium/Altered Mental Status Symptoms:  None  Other Symptoms/Concerns:  None          Past Medical History  Past Medical History:   Diagnosis Date    COPD (chronic obstructive pulmonary disease) (Multi)     Myocardial infarction (Multi)         Past Psychiatric History: 1) Past Dx: anxiety.                                            2) No prior psychiatric hospitalizations                                            3) No prior suicide attempts                                            4) No prior SIB                                            5) No gun ownership and no guns in the home                                            6) One prior rehab treatment program for \"pain pills\" many years ago.                                            7) Mental Health Provider(s): None                                            8) Current psych meds: 1) Lexapro 20 mg Qdaily x 1 year.      Past Psychiatric Meds: 1) None                                                 Family History: 1) No family history of mental illness.                             2) No known suicides in the family.      Social History  Social History     Socioeconomic History    Marital status: Single     Spouse " name: Not on file    Number of children: Not on file    Years of education: Not on file    Highest education level: Not on file   Occupational History    Not on file   Tobacco Use    Smoking status: Every Day     Types: Cigarettes    Smokeless tobacco: Never   Substance and Sexual Activity    Alcohol use: Not on file    Drug use: Not on file    Sexual activity: Not on file   Other Topics Concern    Not on file   Social History Narrative    Not on file     Social Drivers of Health     Financial Resource Strain: Medium Risk (2/13/2025)    Overall Financial Resource Strain (CARDIA)     Difficulty of Paying Living Expenses: Somewhat hard   Food Insecurity: No Food Insecurity (2/13/2025)    Hunger Vital Sign     Worried About Running Out of Food in the Last Year: Never true     Ran Out of Food in the Last Year: Never true   Transportation Needs: Unmet Transportation Needs (2/13/2025)    PRAPARE - Transportation     Lack of Transportation (Medical): Yes     Lack of Transportation (Non-Medical): No   Physical Activity: Not on file   Stress: Not on file   Social Connections: Not on file   Intimate Partner Violence: Not At Risk (2/13/2025)    Humiliation, Afraid, Rape, and Kick questionnaire     Fear of Current or Ex-Partner: No     Emotionally Abused: No     Physically Abused: No     Sexually Abused: No   Housing Stability: Low Risk  (2/13/2025)    Housing Stability Vital Sign     Unable to Pay for Housing in the Last Year: No     Number of Times Moved in the Last Year: 1     Homeless in the Last Year: No        Substance Abuse History:  1) Tobacco - 2-3 cigarettes a day  2) ETOH - Denies  3) Cannabis - Denies  4) History of heroin abuse around 2010 for 3 years, no other illicit drug abuse.      The patient finished the 7th grade.. His work history includes working as an . Never . Four adult children. No significant legal history. The patient lives with his 30 year-old nephew in a house in [place].  "      Allergies  No Known Allergies     Scheduled medications  aspirin, 81 mg, oral, Daily  atorvastatin, 80 mg, oral, Daily  azithromycin, 500 mg, intravenous, q24h  cefTRIAXone, 2 g, intravenous, q24h  escitalopram, 20 mg, oral, Daily  tiotropium, 2 puff, inhalation, Daily   And  fluticasone furoate-vilanteroL, 1 puff, inhalation, Daily  ipratropium-albuteroL, 3 mL, nebulization, 4x daily  metoprolol succinate XL, 50 mg, oral, Daily  pantoprazole, 40 mg, oral, Daily before breakfast   Or  pantoprazole, 40 mg, intravenous, Daily before breakfast      Continuous medications     PRN medications  PRN medications: acetaminophen, ipratropium-albuteroL, LORazepam, melatonin, morphine, ondansetron, polyethylene glycol         Review of Systems   Review of Systems   Constitutional: Negative.    HENT: Negative.     Eyes: Negative.    Respiratory:          1) COPD   Cardiovascular:         1) HTN  2) HFrEF   Gastrointestinal: Negative.    Endocrine: Negative.    Genitourinary: Negative.    Musculoskeletal: Negative.    Skin: Negative.    Allergic/Immunologic: Negative.    Neurological: Negative.    Hematological: Negative.    Psychiatric/Behavioral:  Positive for dysphoric mood (see HPI).         Physical Exam  Mental Status Exam:   General: Appropriately groomed and dressed in hospital attire, laying in bed, awake and alert.   Appearance: Appears stated age.   Attitude: Calm, cooperative.   Behavior: Appropriate eye contact.   Motor Activity: No agitation or retardation. No EPS/TD.  Impaired gait and station, as patient uses a walker. Normal muscle tone and bulk.   Speech: Regular rate, rhythm, volume and tone, spontaneous,  fluent. Non-pressured.   Mood: \"Not bad\"   Affect: Neutral.   Thought Process: Organized, and goal directed.   Thought Content: Does not currently endorse suicidal ideation or any suicide plans.   Does not endorse homicidal ideation.  No overt delusions or paranoia elicited.    Thought Perception: Does " not endorse auditory or visual hallucinations, does not appear to be responding to hallucinatory stimuli.   Cognition: Alert, oriented x 3. No deficits noted.  Adequate fund of knowledge. No deficit in recent and remote memory. No deficits in attention, concentration or language.   Insight: Fair-to-good, as patient recognizes symptoms of  illness and need for recommended treatments.    Judgment: Intact, as patient can make reasonable decisions about ordinary activities of daily living and necessary medical care recommendations.       Last Recorded Vitals  Visit Vitals  /76 (BP Location: Left arm, Patient Position: Lying)   Pulse 68   Temp 36.8 °C (98.2 °F) (Temporal)   Resp 18        Relevant Results  Results for orders placed or performed during the hospital encounter of 02/12/25 (from the past 24 hours)   CBC and Auto Differential   Result Value Ref Range    WBC 11.0 4.4 - 11.3 x10*3/uL    nRBC 0.0 0.0 - 0.0 /100 WBCs    RBC 4.25 (L) 4.50 - 5.90 x10*6/uL    Hemoglobin 11.9 (L) 13.5 - 17.5 g/dL    Hematocrit 36.7 (L) 41.0 - 52.0 %    MCV 86 80 - 100 fL    MCH 28.0 26.0 - 34.0 pg    MCHC 32.4 32.0 - 36.0 g/dL    RDW 12.3 11.5 - 14.5 %    Platelets 378 150 - 450 x10*3/uL    Neutrophils % 78.4 40.0 - 80.0 %    Immature Granulocytes %, Automated 0.5 0.0 - 0.9 %    Lymphocytes % 14.4 13.0 - 44.0 %    Monocytes % 5.9 2.0 - 10.0 %    Eosinophils % 0.4 0.0 - 6.0 %    Basophils % 0.4 0.0 - 2.0 %    Neutrophils Absolute 8.59 (H) 1.20 - 7.70 x10*3/uL    Immature Granulocytes Absolute, Automated 0.05 0.00 - 0.70 x10*3/uL    Lymphocytes Absolute 1.58 1.20 - 4.80 x10*3/uL    Monocytes Absolute 0.65 0.10 - 1.00 x10*3/uL    Eosinophils Absolute 0.04 0.00 - 0.70 x10*3/uL    Basophils Absolute 0.04 0.00 - 0.10 x10*3/uL   Basic Metabolic Panel   Result Value Ref Range    Glucose 95 74 - 99 mg/dL    Sodium 138 136 - 145 mmol/L    Potassium 3.7 3.5 - 5.3 mmol/L    Chloride 100 98 - 107 mmol/L    Bicarbonate 30 21 - 32 mmol/L     "Anion Gap 12 10 - 20 mmol/L    Urea Nitrogen 22 6 - 23 mg/dL    Creatinine 0.85 0.50 - 1.30 mg/dL    eGFR >90 >60 mL/min/1.73m*2    Calcium 8.2 (L) 8.6 - 10.3 mg/dL         Diagnostic Impression:  1) Major Depressive Disorder, single, moderate  2) Generalized Anxiety Disorder  3) COPD  4) HTN  5) HFrEF        Recommendations:  1) continue Lexapro 20 mg Qdaily (for now)  2) recommend outpatient mental health services (referral for medication evaluation outpatient).  3) Ino is judged a minimal suicide risk due to: 1) Denies gun ownership and denies having any guns at home, 2) Denies any prior suicide attempts, 3) Denies any current suicidal ideation or suicide plans, 4) +plans for the future: \"... Try to get better... try to move around a little better...,\" and 5) Only meeting criteria for Moderate Major Depressive Disorder. He is currently judged safe to be discharged from a psychiatric viewpoint.          I spent 60 minutes in the professional and overall care of this patient.        Oscar Lund MD   "

## 2025-02-14 NOTE — CARE PLAN
The patient's goals for the shift include      The clinical goals for the shift include Pt will be HDS throughout shift      Problem: Pain - Adult  Goal: Verbalizes/displays adequate comfort level or baseline comfort level  Outcome: Progressing     Problem: Safety - Adult  Goal: Free from fall injury  Outcome: Progressing     Problem: Discharge Planning  Goal: Discharge to home or other facility with appropriate resources  Outcome: Progressing     Problem: Chronic Conditions and Co-morbidities  Goal: Patient's chronic conditions and co-morbidity symptoms are monitored and maintained or improved  Outcome: Progressing     Problem: Nutrition  Goal: Nutrient intake appropriate for maintaining nutritional needs  Outcome: Progressing     Problem: Skin  Goal: Decreased wound size/increased tissue granulation at next dressing change  Outcome: Progressing  Flowsheets (Taken 2/13/2025 1932)  Decreased wound size/increased tissue granulation at next dressing change: Promote sleep for wound healing  Goal: Participates in plan/prevention/treatment measures  Outcome: Progressing  Flowsheets (Taken 2/13/2025 1932)  Participates in plan/prevention/treatment measures: Elevate heels  Goal: Prevent/manage excess moisture  Outcome: Progressing  Flowsheets (Taken 2/13/2025 1932)  Prevent/manage excess moisture:   Cleanse incontinence/protect with barrier cream   Moisturize dry skin  Goal: Prevent/minimize sheer/friction injuries  Outcome: Progressing  Flowsheets (Taken 2/13/2025 1932)  Prevent/minimize sheer/friction injuries: Use pull sheet  Goal: Promote/optimize nutrition  Outcome: Progressing  Flowsheets (Taken 2/13/2025 1932)  Promote/optimize nutrition: Monitor/record intake including meals  Goal: Promote skin healing  Outcome: Progressing  Flowsheets (Taken 2/13/2025 1932)  Promote skin healing: Protective dressings over bony prominences     Problem: Fall/Injury  Goal: Not fall by end of shift  Outcome: Progressing  Goal: Be free  from injury by end of the shift  Outcome: Progressing  Goal: Verbalize understanding of personal risk factors for fall in the hospital  Outcome: Progressing  Goal: Verbalize understanding of risk factor reduction measures to prevent injury from fall in the home  Outcome: Progressing  Goal: Use assistive devices by end of the shift  Outcome: Progressing  Goal: Pace activities to prevent fatigue by end of the shift  Outcome: Progressing     Problem: Pain  Goal: Takes deep breaths with improved pain control throughout the shift  Outcome: Progressing  Goal: Turns in bed with improved pain control throughout the shift  Outcome: Progressing  Goal: Walks with improved pain control throughout the shift  Outcome: Progressing  Goal: Performs ADL's with improved pain control throughout shift  Outcome: Progressing  Goal: Participates in PT with improved pain control throughout the shift  Outcome: Progressing  Goal: Free from opioid side effects throughout the shift  Outcome: Progressing  Goal: Free from acute confusion related to pain meds throughout the shift  Outcome: Progressing     Problem: Risk for Suicide  Goal: Accepts medications as prescribed/needed this shift  Outcome: Progressing  Goal: Identifies supports this shift  Outcome: Progressing  Goal: Makes needs known through verbalization or behaviors this shift  Outcome: Progressing  Goal: No self harm this shift  Outcome: Progressing  Goal: Read Safety Guidelines this shift  Outcome: Progressing  Goal: Complete Mental Health Safety Plan (psychiatry only) this shift  Outcome: Progressing

## 2025-02-14 NOTE — PROGRESS NOTES
Occupational Therapy    Evaluation    Patient Name: Ino Pierce  MRN: [id number]  Department: [facility] [place]  Room: [address] [place] [address]-A  Today's Date: 2/14/2025  Time Calculation  Start Time: 1211  Stop Time: 1227  Time Calculation (min): 16 min        Assessment:  OT Assessment: Pt is 60 year old male admitted for PNA. Pt demo'd mobility with CGA-SUP level. Pt would benefit from skilled OT services during hospital stay to address balance, endurance and independence in ADLs.  Prognosis: Good  Barriers to Discharge Home: No anticipated barriers  Evaluation/Treatment Tolerance: Patient tolerated treatment well  End of Session Communication: Bedside nurse  End of Session Patient Position: Bed, 2 rail up, Alarm off, not on at start of session  OT Assessment Results: Decreased ADL status, Decreased endurance, Decreased safe judgment during ADL, Decreased functional mobility  Prognosis: Good  Evaluation/Treatment Tolerance: Patient tolerated treatment well  Plan:  Treatment Interventions: ADL retraining, Functional transfer training, UE strengthening/ROM, Endurance training, Patient/family training, Equipment evaluation/education  OT Frequency: 2 times per week  OT Discharge Recommendations: Low intensity level of continued care  Equipment Recommended upon Discharge:  (TBD)  OT Recommended Transfer Status: Assist of 1  OT - OK to Discharge: Yes (When medically appropriate)  Treatment Interventions: ADL retraining, Functional transfer training, UE strengthening/ROM, Endurance training, Patient/family training, Equipment evaluation/education    Subjective   Current Problem:  1. Pneumonia of both lungs due to infectious organism, unspecified part of lung        2. Admission for hospice care          General:  General  Reason for Referral: PNA  Referred By: Khoi  Past Medical History Relevant to Rehab: stage IV severe COPD, chronic hypoxic hypercapnic respiratory failure on 5 L nasal cannula, cigarette dependence, CAD, HTN, HLD, HFrEF,  seizure in 2016, DAV  Family/Caregiver Present: No  Co-Treatment: PT  Co-Treatment Reason: to optimize mobility and safety while focusing on discipline specific needs  Prior to Session Communication: Bedside nurse  Patient Position Received: Bed, 2 rail up, Alarm off, not on at start of session  General Comment: Pt supine with HOB elevated. Pt pleasant and cooperative.  Precautions:  Medical Precautions: Fall precautions, Oxygen therapy device and L/min     Date/Time Vitals Session Patient Position Pulse Resp SpO2 BP MAP (mmHg)    02/14/25 1142 --  --  --  18  99 %  --  --     02/14/25 1211 During OT  --  75  --  98 %  --  --     02/14/25 1212 During PT  --  75  --  98 %  --  --                 Pain:  Pain Assessment  Pain Assessment: 0-10  0-10 (Numeric) Pain Score: 9  Pain Type: Chronic pain  Pain Location: Back  Pain Orientation: Lower    Objective   Cognition:  Overall Cognitive Status: Within Functional Limits  Orientation Level: Oriented X4           Home Living:  Type of Home: House  Lives With:  (Nephew and family)  Home Adaptive Equipment: Walker rolling or standard  Home Layout: Able to live on main level with bedroom/bathroom  Home Access: Stairs to enter with rails  Entrance Stairs-Rails:  (1 HR)  Entrance Stairs-Number of Steps: 4  Bathroom Shower/Tub: Tub/shower unit  Bathroom Toilet: Standard  Bathroom Equipment: Shower chair with back (Not present currently)  Bathroom Accessibility: Typically sponge bathing at sink  Prior Function:  Level of [place]: Independent with ADLs and functional transfers, Needs assistance with homemaking  Receives Help From: Family  ADL Assistance: Independent (Reports increased difficulty to perform and requiring increased time)  Homemaking Assistance: Needs assistance  Ambulatory Assistance: Independent (MOD I with walker)  Prior Function Comments: (+) falls  IADL History:  IADL Comments: (-) drive  ADL:  Eating Assistance: Independent  Grooming Assistance: Stand  by  Bathing Assistance: Minimal (Anticipated)  UE Dressing Assistance: Stand by (Anticipated)  LE Dressing Assistance: Stand by (Anticipated)  Toileting Assistance with Device: Stand by (Anticipated)  Activity Tolerance:  Endurance: Tolerates 10 - 20 min exercise with multiple rests  Activity Tolerance Comments: Pt did not appear short of breath during mobility, following seated rest break obsevred increased shortness of breath. Requiring 2 minute recovery. spO2 98%  Bed Mobility/Transfers: Bed Mobility  Bed Mobility: Yes  Bed Mobility 1  Bed Mobility 1: Supine to sitting  Level of Assistance 1: Modified independent    Transfers  Transfer: Yes  Transfer 1  Transfer From 1: Sit to, Stand to  Transfer to 1: Sit, Stand  Technique 1: Sit to stand, Stand to sit  Transfer Device 1: Walker  Transfer Level of Assistance 1: Close supervision      Functional Mobility:  Functional Mobility  Functional Mobility Performed: Yes  Functional Mobility 1  Surface 1: Level tile  Device 1: Rolling walker  Assistance 1: Close supervision, Contact guard  Comments 1: Functional mobility for household distance x4 using FWW. initally CGA provided, progressed to SBA. Mild unsteadiness observed.  Sitting Balance:  Static Sitting Balance  Static Sitting-Balance Support: No upper extremity supported  Static Sitting-Level of Assistance: Distant supervision  Dynamic Sitting Balance  Dynamic Sitting-Balance Support: No upper extremity supported  Dynamic Sitting-Level of Assistance: Close supervision  Standing Balance:  Static Standing Balance  Static Standing-Balance Support: Bilateral upper extremity supported  Static Standing-Level of Assistance: Close supervision  Dynamic Standing Balance  Dynamic Standing-Balance Support: Bilateral upper extremity supported  Dynamic Standing-Level of Assistance: Close supervision, Contact guard   Modalities:     Vision:Vision - Basic Assessment  Current Vision: No visual deficits  Sensation:  Sensation  Comment: Denies numbness or tingling  Strength:  Strength Comments: BUE grossly WFL  Perception:     Coordination:      Hand Function:  Gross Grasp: Functional  Coordination: Functional      Outcome Measures:[facility] Daily Activity  Putting on and taking off regular lower body clothing: A little  Bathing (including washing, rinsing, drying): A little  Putting on and taking off regular upper body clothing: A little  Toileting, which includes using toilet, bedpan or urinal: A little  Taking care of personal grooming such as brushing teeth: A little  Eating Meals: None  Daily Activity - Total Score: 19        Education Documentation  ADL Training, taught by Herlinda Workman OT at 2/14/2025  1:10 PM.  Learner: Patient  Readiness: Acceptance  Method: Explanation  Response: Verbalizes Understanding    Education Comments  No comments found.        OP EDUCATION:       Goals:  Encounter Problems       Encounter Problems (Active)       ADLs       Patient with complete lower body dressing with modified independent level of assistance  with PRN adaptive equipment (Progressing)       Start:  02/14/25    Expected End:  02/28/25            Patient will complete toileting including hygiene clothing management/hygiene with modified independent level of assistance. (Progressing)       Start:  02/14/25    Expected End:  02/28/25               BALANCE       Pt will maintain dynamic standing balance during ADL task with modified independent level of assistance in order to demonstrate decreased risk of falling and improved postural control. (Progressing)       Start:  02/14/25    Expected End:  02/28/25               COGNITION/SAFETY       Pt able to integrate 1-2 energy conservation techniques into ADL or therapeutic activities without verbal cuing  (Progressing)       Start:  02/14/25    Expected End:  02/28/25               TRANSFERS       Patient will complete functional transfers with least restrictive device with modified independent  level of assistance. (Progressing)       Start:  02/14/25    Expected End:  02/28/25

## 2025-02-14 NOTE — PROGRESS NOTES
"SW notified by Dr. Chacko pt is wanting to return home with hospice. SW met with pt, introduced self and role with [facility]. Confirmed pt is wanting Hospice care, shared he was with [facility] Palliative care but they discharged him from services. Pt stated during conversation \"I was supposed to kill myself\", SW explored this though with pt, pt explained feeling hopeless after being let go from palliative services, shared he has experienced many losses in the past year. SW inquired if pt is still experiencing these thoughts, pt explained since being in the hospital he feels the staff and provider have given him more hope and that hospice is what he needs. Pt identified wanting to use [facility] for hospice services. [facility] notified [facility] team.  Agency will contact family to arrange meeting and discuss services. SW informed Dr. Chacko via secure chat of pt comments made during conversation. SW to follow as needed.    Ulises Whiting, MSW, LSW ([id number])   [facility]   "

## 2025-02-14 NOTE — PROGRESS NOTES
Physical Therapy    Physical Therapy Evaluation    Patient Name: Ino Pierce  MRN: [id number]  Today's Date: 2/14/2025   Time Calculation  Start Time: 1212  Stop Time: 1231  Time Calculation (min): 19 min  2327/2327-A    Assessment/Plan   PT Assessment  PT Assessment Results: Decreased strength, Decreased endurance, Impaired balance, Decreased mobility, Decreased safety awareness, Pain  Rehab Prognosis: Good  Barriers to Discharge Home: Physical needs  Physical Needs: Intermittent mobility assistance needed  Evaluation/Treatment Tolerance: Patient tolerated treatment well, Patient limited by fatigue  Medical Staff Made Aware: Yes  End of Session Communication: Bedside nurse  Assessment Comment: Patient presents with deficits in balance (LOB posteriorly without UE support with perturbation), strength, and endurance. He requires 1 person assistance for ambulation safety due to LOB. Continued PT would be beneficial for balance training; recommend LOW INTENSITY rehab.  End of Session Patient Position: Bed, 2 rail up, Alarm off, not on at start of session  IP OR SWING BED PT PLAN  Inpatient or Swing Bed: Inpatient     02/14/25 1212   PT Plan   Treatment/Interventions Gait training;Stair training;Balance training;Strengthening;Therapeutic exercise;Therapeutic activity;Home exercise program   PT Plan Ongoing PT   PT Frequency 4 times per week   PT Discharge Recommendations Low intensity level of continued care   Equipment Recommended upon Discharge Wheeled walker   PT Recommended Transfer Status Assist x1   PT - OK to Discharge Yes  (Once medically cleared.)       Subjective     Current Problem:  1. Pneumonia of both lungs due to infectious organism, unspecified part of lung        2. Admission for hospice care          Patient Active Problem List   Diagnosis    Chronic HFrEF (heart failure with reduced ejection fraction)    Chronic, continuous use of opioids    Coronary artery disease involving native coronary artery of  native heart without angina pectoris    Chronic low back pain    Essential hypertension    DAV (generalized anxiety disorder)    Hyperthyroidism    IVDU (intravenous drug user)    Lung nodules    COPD, severe (Multi)    Renal cell carcinoma of right kidney (Multi)    Seizure (Multi)    Takotsubo cardiomyopathy    Pneumonia of both lungs due to infectious organism, unspecified part of lung       General Visit Information:  General  Reason for Referral: PNA B lungs. Impaired mobility.  Referred By: Mansi Westfall PA-C  Past Medical History Relevant to Rehab: PMHx: Severe COPD, CAD, HTN, HFrEF, seizure. (61 y/o male admitted with PNA B lungs, severe malnutrition.)  Missed Visit Reason: Patient refused (Chart reviewed. RN approved PT evaluation. Approached patient for assessment, educated pt in what needed to observe. Patient states he does not feel up to participating in PT today, but agreeable to try tomorrow. PT to reattempt tomorrow.)  Family/Caregiver Present: No  Co-Treatment: OT  Co-Treatment Reason: To conserve energy with consideration of current medical status.  Prior to Session Communication: Bedside nurse  Patient Position Received: Alarm off, not on at start of session, Bed, 3 rail up  General Comment: Patient alert, agreeable to participate in PT.    Home Living:  Home Living  Type of Home: House  Lives With:  (Nephew's family)  Home Adaptive Equipment: Walker rolling or standard  Home Layout: One level  Home Access: Stairs to enter with rails  Entrance Stairs-Rails:  (1)  Entrance Stairs-Number of Steps: 4  Bathroom Shower/Tub: Tub/shower unit  Bathroom Equipment: Shower chair with back (Is currently not at nephew's home.)    Prior Level of Function:  Prior Function Per Pt/Caregiver Report  Level of [place]: Independent with ADLs and functional transfers, Needs assistance with homemaking  Receives Help From: Family  ADL Assistance: Independent  Homemaking Assistance: Needs assistance  Ambulatory  Assistance: Independent (With WW)  Prior Function Comments: (+) falls (-) drive. Nephew provides meals.    Precautions:  Precautions  Precautions Comment: Fall precautions. 5LO2.    Vital Signs:  Vital Signs  Vitals Session: During PT  Heart Rate: 75  SpO2: 98 % (After ambulation though mod-severe SOB. Encouraged longer pursed lip exhale and shoulder rolls to loosen accessory muscles as able.)  Patient Position: Sitting  Objective     Pain:  Pain Assessment  Pain Assessment: 0-10  0-10 (Numeric) Pain Score: 9  Pain Type: Chronic pain  Pain Location: Back  Pain Orientation: Lower  Pain Interventions: Ambulation/increased activity    Cognition:  Cognition  Overall Cognitive Status: Within Functional Limits  Orientation Level: Oriented X4  Safety/Judgement:  (Patient reports that he plans to get up to the BSC as needed without assist when previously observed self-LOB posteriorly with inability to correct in standing. Pt encouraged to request assist with ambulation and use of WW.)  Impulsive: Mildly    General Assessments:  General Observation  General Observation: Encouraged pt to request assistance with transfers and ambulation using a WW for walking to the bathroom due to LOB noted with dynamic balance testing.   Activity Tolerance  Endurance: Tolerates less than 10 min exercise, no significant change in vital signs, Decreased tolerance for upright activites  Rate of Perceived Exertion (RPE): MODIFIED RADHA RPE 7/10.  Sensation  Light Touch: No apparent deficits  Sensation Comment: No numbness or tingling.  Strength  Strength Comments: B LE 4/5 including ankle planes.  Perception  Inattention/Neglect: Appears intact  Coordination  Movements are Fluid and Coordinated: Yes     Static Sitting Balance  Static Sitting-Balance Support: Feet supported, Bilateral upper extremity supported  Static Sitting-Level of Assistance: Distant supervision  Static Standing Balance  Static Standing-Balance Support: No upper extremity  supported  Static Standing-Level of Assistance: Contact guard  Dynamic Standing Balance  Dynamic Standing-Balance Support: No upper extremity supported  Dynamic Standing-Level of Assistance: Contact guard  Dynamic Standing-Comments: Stands with eyes closed 10 seconds without LOB. LOB with moderate perturbation posteriorly and laterally with attempt at stepping recovery strategy with need for MIN A to recover safely.    Functional Assessments:     Bed Mobility  Bed Mobility:  ([place] > sit supervision.)  Transfers  Transfer:  (Sit <> stand SBA.)  Ambulation/Gait Training  Ambulation/Gait Training Performed:  (Ambulated 20 feet x 4 with WW and CGA progressing to SBA, short step length with ability to increase with VC.)  Stairs  Stairs: No (Limited endurance with gait this date.)       Extremity/Trunk Assessments:        RLE   RLE : Within Functional Limits  LLE   LLE : Within Functional Limits    Outcome Measures:     [facility] Basic Mobility  Turning from your back to your side while in a flat bed without using bedrails: None  Moving from lying on your back to sitting on the side of a flat bed without using bedrails: None  Moving to and from bed to chair (including a wheelchair): None  Standing up from a chair using your arms (e.g. wheelchair or bedside chair): None  To walk in hospital room: A little  Climbing 3-5 steps with railing: Total  Basic Mobility - Total Score: 20                                                             Goals:  Encounter Problems       Encounter Problems (Active)       Mobility       Patient will negotiate 4 steps with 1 rail and SBA.       Start:  02/14/25    Expected End:  02/28/25               To improve strength, balance, and activity tolerance:        Patient will participate in dynamic stand balance activities including exercise x 15 repetitions with/out UE support with SBA - demonstrating ability to recover LOB posteriorly 100% time.       Start:  02/14/25    Expected End:  02/28/25             Patient will ambulate 50 feet x 3 with WW vs rollator (for energy conservation) with increased step length and SBA/S.        Start:  02/14/25    Expected End:  02/28/25                 Education Documentation  Mobility Training, taught by Sandra Segal PT at 2/14/2025  1:27 PM.  Learner: Patient  Readiness: Acceptance  Method: Explanation  Response: Verbalizes Understanding         Education Comments  No comments found.

## 2025-02-14 NOTE — CONSULTS
Palliative Care Consultation    History obtained from: patient and medical records.    HPI: Ino Pierce is a 60 y.o. male with past medical history significant for stage 4 COPD, chronic hypoxic/hypercapnic respiratory on home oxygen x 7 years, seizure, chronic HFrEF, RCC s/p right partial nephrectomy 07/2019, MI, chronic pain and anxiety (chronic opioid and benzo per [place]ARRS). Patient presented to the ED 2/12 d/t weakness, fall, shortness of breath, cough and passive SI. 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    Met with patient who declined others to be present for meeting. Discussed PMH, HPI, and current hospitalization. Answered questions, provided support and education.     Social History   Marital Status: never    Children: 4, 3 grandchildren  Occupation: worked previously as a [employer] service: never served in the    Tobacco use: started smoking at age 15, current smoker   Alcohol use: denies  Drug use: heroin in the past, last used >10 years ago  Mandaen/Cultural/Spiritual: Baptist  Patient finds johana and happiness in: grandchildren, nieces and nephews    Living arrangement and functional status prior to admission: Patient has lived with his nephew and his family for the last 3 months  Recent falls: yes, 2 falls over the last couple of months   Cognitive status: a/o x's 4, appears to have capacity  ADL's dressing/bathing: sponge bathes, independent   Assistive devices: walker at times  Cooking, cleaning, laundry: family  Finances: independent  Medication management:  "independent   Grocery shopping: uses electric cart   Transportation: no longer driving, last drove 1.5 years ago     Prior Hospitalizations: This is patient's first hospitalization since December 2023 11/21/24: ED COPD exacerbation     Patient/family description of QOL over the last 6 months: \"wasn't doing too bad until I got this pneumonia\". Also feels sad from recent losses including 4 family members and 2 dogs in the last year      Goals of Care  Decisional Capacity: yes  Understanding of illness: good   Most important at this time: \"get back to working on cars or small engines to yovanny with\"  Fears/concerns: denies     Current code status: Full Code   Code status discussed today? Yes, code status changed to DNR/DNI     Review of Systems   Constitutional:  Positive for fatigue.   Respiratory:  Positive for shortness of breath.    Musculoskeletal:  Positive for back pain.   Neurological:  Positive for weakness.   Psychiatric/Behavioral:  Negative for confusion.        Physical Exam  HENT:      Head: Normocephalic.      Nose: Nose normal.      Mouth/Throat:      Mouth: Mucous membranes are moist.   Eyes:      General: No scleral icterus.  Cardiovascular:      Rate and Rhythm: Normal rate.   Pulmonary:      Effort: Pulmonary effort is normal.      Comments: Oxygen via NC  Musculoskeletal:      Cervical back: Neck supple.   Neurological:      Mental Status: He is alert and oriented to person, place, and time.       Plan    OARRS and outpatient palliative care notes reviewed  Consult music therapy and pastoral care   Expectations of treatment/Goals of care: home with hospice   Advanced directives/Advanced care planning: requested copies of HCPOA and living [facility]  Health care power of  or Identified surrogate medical decision maker based on [place] hierarchy: ex-girlfriend Jacquelyn is reported HCPOA, patient states he does not want to update documents and comfortable with her being POA. Without document, patient's " 4 adult children would be his medical decision makers if patient lost capacity to make his own medical decisions   Code status: DNR/DNI [place] DNR: completed [place] DNR CCA and instructed patient to hang on the refrigerator once home, copy placed on hospital chart. Will likely transition to [facility] once enrolled into hospice  Outpatient referral: Previously active with [facility] palliative care. Patient requesting [facility]     Collaborated with: Dr. Chacko, RN, TCC and SW    I spent 80 minutes in the professional and overall care of this patient.      Hussain Bailey, APRN-CNP

## 2025-02-15 LAB
ANION GAP SERPL CALC-SCNC: 8 MMOL/L (ref 10–20)
BASOPHILS # BLD AUTO: 0.03 X10*3/UL (ref 0–0.1)
BASOPHILS NFR BLD AUTO: 0.4 %
BUN SERPL-MCNC: 14 MG/DL (ref 6–23)
CALCIUM SERPL-MCNC: 8.2 MG/DL (ref 8.6–10.3)
CHLORIDE SERPL-SCNC: 101 MMOL/L (ref 98–107)
CO2 SERPL-SCNC: 30 MMOL/L (ref 21–32)
CREAT SERPL-MCNC: 0.63 MG/DL (ref 0.5–1.3)
EGFRCR SERPLBLD CKD-EPI 2021: >90 ML/MIN/1.73M*2
EOSINOPHIL # BLD AUTO: 0.12 X10*3/UL (ref 0–0.7)
EOSINOPHIL NFR BLD AUTO: 1.6 %
ERYTHROCYTE [DISTWIDTH] IN BLOOD BY AUTOMATED COUNT: 12.3 % (ref 11.5–14.5)
GLUCOSE SERPL-MCNC: 87 MG/DL (ref 74–99)
HCT VFR BLD AUTO: 35.3 % (ref 41–52)
HGB BLD-MCNC: 11.2 G/DL (ref 13.5–17.5)
IMM GRANULOCYTES # BLD AUTO: 0.03 X10*3/UL (ref 0–0.7)
IMM GRANULOCYTES NFR BLD AUTO: 0.4 % (ref 0–0.9)
LYMPHOCYTES # BLD AUTO: 1.51 X10*3/UL (ref 1.2–4.8)
LYMPHOCYTES NFR BLD AUTO: 20 %
MCH RBC QN AUTO: 27 PG (ref 26–34)
MCHC RBC AUTO-ENTMCNC: 31.7 G/DL (ref 32–36)
MCV RBC AUTO: 85 FL (ref 80–100)
MONOCYTES # BLD AUTO: 0.5 X10*3/UL (ref 0.1–1)
MONOCYTES NFR BLD AUTO: 6.6 %
NEUTROPHILS # BLD AUTO: 5.37 X10*3/UL (ref 1.2–7.7)
NEUTROPHILS NFR BLD AUTO: 71 %
NRBC BLD-RTO: 0 /100 WBCS (ref 0–0)
PLATELET # BLD AUTO: 308 X10*3/UL (ref 150–450)
POTASSIUM SERPL-SCNC: 3.9 MMOL/L (ref 3.5–5.3)
RBC # BLD AUTO: 4.15 X10*6/UL (ref 4.5–5.9)
SODIUM SERPL-SCNC: 135 MMOL/L (ref 136–145)
WBC # BLD AUTO: 7.6 X10*3/UL (ref 4.4–11.3)

## 2025-02-15 PROCEDURE — 85025 COMPLETE CBC W/AUTO DIFF WBC: CPT | Performed by: INTERNAL MEDICINE

## 2025-02-15 PROCEDURE — 2500000004 HC RX 250 GENERAL PHARMACY W/ HCPCS (ALT 636 FOR OP/ED)

## 2025-02-15 PROCEDURE — 1210000001 HC SEMI-PRIVATE ROOM DAILY

## 2025-02-15 PROCEDURE — 99232 SBSQ HOSP IP/OBS MODERATE 35: CPT | Performed by: INTERNAL MEDICINE

## 2025-02-15 PROCEDURE — 2500000001 HC RX 250 WO HCPCS SELF ADMINISTERED DRUGS (ALT 637 FOR MEDICARE OP): Performed by: INTERNAL MEDICINE

## 2025-02-15 PROCEDURE — 2500000001 HC RX 250 WO HCPCS SELF ADMINISTERED DRUGS (ALT 637 FOR MEDICARE OP): Performed by: REGISTERED NURSE

## 2025-02-15 PROCEDURE — 94668 MNPJ CHEST WALL SBSQ: CPT

## 2025-02-15 PROCEDURE — 36415 COLL VENOUS BLD VENIPUNCTURE: CPT | Performed by: INTERNAL MEDICINE

## 2025-02-15 PROCEDURE — 80048 BASIC METABOLIC PNL TOTAL CA: CPT | Performed by: INTERNAL MEDICINE

## 2025-02-15 PROCEDURE — 2500000002 HC RX 250 W HCPCS SELF ADMINISTERED DRUGS (ALT 637 FOR MEDICARE OP, ALT 636 FOR OP/ED)

## 2025-02-15 PROCEDURE — 94640 AIRWAY INHALATION TREATMENT: CPT

## 2025-02-15 PROCEDURE — 2500000001 HC RX 250 WO HCPCS SELF ADMINISTERED DRUGS (ALT 637 FOR MEDICARE OP)

## 2025-02-15 RX ADMIN — CEFTRIAXONE SODIUM 2 G: 2 INJECTION, SOLUTION INTRAVENOUS at 00:23

## 2025-02-15 RX ADMIN — IPRATROPIUM BROMIDE AND ALBUTEROL SULFATE 3 ML: 2.5; .5 SOLUTION RESPIRATORY (INHALATION) at 15:44

## 2025-02-15 RX ADMIN — TIOTROPIUM BROMIDE INHALATION SPRAY 2 PUFF: 3.12 SPRAY, METERED RESPIRATORY (INHALATION) at 06:01

## 2025-02-15 RX ADMIN — ATORVASTATIN CALCIUM 80 MG: 40 TABLET, FILM COATED ORAL at 06:00

## 2025-02-15 RX ADMIN — LORAZEPAM 0.5 MG: 0.5 TABLET ORAL at 17:52

## 2025-02-15 RX ADMIN — PANTOPRAZOLE SODIUM 40 MG: 40 TABLET, DELAYED RELEASE ORAL at 06:00

## 2025-02-15 RX ADMIN — METOPROLOL SUCCINATE 50 MG: 50 TABLET, EXTENDED RELEASE ORAL at 08:24

## 2025-02-15 RX ADMIN — MORPHINE SULFATE 15 MG: 15 TABLET ORAL at 17:52

## 2025-02-15 RX ADMIN — ESCITALOPRAM OXALATE 20 MG: 10 TABLET ORAL at 08:16

## 2025-02-15 RX ADMIN — MORPHINE SULFATE 15 MG: 15 TABLET ORAL at 06:00

## 2025-02-15 RX ADMIN — CEFTRIAXONE SODIUM 2 G: 2 INJECTION, SOLUTION INTRAVENOUS at 23:24

## 2025-02-15 RX ADMIN — IPRATROPIUM BROMIDE AND ALBUTEROL SULFATE 3 ML: 2.5; .5 SOLUTION RESPIRATORY (INHALATION) at 21:05

## 2025-02-15 RX ADMIN — MORPHINE SULFATE 15 MG: 15 TABLET ORAL at 23:55

## 2025-02-15 RX ADMIN — FLUTICASONE FUROATE AND VILANTEROL TRIFENATATE 1 PUFF: 200; 25 POWDER RESPIRATORY (INHALATION) at 06:01

## 2025-02-15 RX ADMIN — LORAZEPAM 0.5 MG: 0.5 TABLET ORAL at 06:00

## 2025-02-15 RX ADMIN — MORPHINE SULFATE 15 MG: 15 TABLET ORAL at 00:23

## 2025-02-15 RX ADMIN — MORPHINE SULFATE 15 MG: 15 TABLET ORAL at 11:46

## 2025-02-15 RX ADMIN — IPRATROPIUM BROMIDE AND ALBUTEROL SULFATE 3 ML: 2.5; .5 SOLUTION RESPIRATORY (INHALATION) at 08:05

## 2025-02-15 RX ADMIN — IPRATROPIUM BROMIDE AND ALBUTEROL SULFATE 3 ML: 2.5; .5 SOLUTION RESPIRATORY (INHALATION) at 11:47

## 2025-02-15 RX ADMIN — ASPIRIN 81 MG: 81 TABLET, COATED ORAL at 08:16

## 2025-02-15 RX ADMIN — AZITHROMYCIN MONOHYDRATE 500 MG: 500 INJECTION, POWDER, LYOPHILIZED, FOR SOLUTION INTRAVENOUS at 09:44

## 2025-02-15 ASSESSMENT — COGNITIVE AND FUNCTIONAL STATUS - GENERAL
MOBILITY SCORE: 18
WALKING IN HOSPITAL ROOM: A LOT
TOILETING: A LITTLE
MOVING TO AND FROM BED TO CHAIR: A LITTLE
STANDING UP FROM CHAIR USING ARMS: A LITTLE
DAILY ACTIVITIY SCORE: 20
HELP NEEDED FOR BATHING: A LITTLE
DAILY ACTIVITIY SCORE: 20
DRESSING REGULAR UPPER BODY CLOTHING: A LITTLE
DRESSING REGULAR LOWER BODY CLOTHING: A LITTLE
DRESSING REGULAR UPPER BODY CLOTHING: A LITTLE
STANDING UP FROM CHAIR USING ARMS: A LITTLE
WALKING IN HOSPITAL ROOM: A LOT
MOVING TO AND FROM BED TO CHAIR: A LITTLE
CLIMB 3 TO 5 STEPS WITH RAILING: A LOT
DRESSING REGULAR LOWER BODY CLOTHING: A LITTLE
HELP NEEDED FOR BATHING: A LITTLE
CLIMB 3 TO 5 STEPS WITH RAILING: A LOT
MOBILITY SCORE: 18
TOILETING: A LITTLE

## 2025-02-15 ASSESSMENT — PAIN DESCRIPTION - LOCATION
LOCATION: BACK

## 2025-02-15 ASSESSMENT — PAIN SCALES - GENERAL
PAINLEVEL_OUTOF10: 3
PAINLEVEL_OUTOF10: 8
PAINLEVEL_OUTOF10: 3
PAINLEVEL_OUTOF10: 8
PAINLEVEL_OUTOF10: 6
PAINLEVEL_OUTOF10: 3
PAINLEVEL_OUTOF10: 3
PAINLEVEL_OUTOF10: 8
PAINLEVEL_OUTOF10: 7

## 2025-02-15 ASSESSMENT — PAIN DESCRIPTION - ORIENTATION
ORIENTATION: LOWER

## 2025-02-15 ASSESSMENT — PAIN - FUNCTIONAL ASSESSMENT
PAIN_FUNCTIONAL_ASSESSMENT: 0-10

## 2025-02-15 NOTE — CARE PLAN
The patient's goals for the shift include      The clinical goals for the shift include Safety managment and remain HDS throughout the shift.

## 2025-02-15 NOTE — PROGRESS NOTES
Ino Pierce is a 60 y.o. male on day 2 of admission presenting with Pneumonia of both lungs due to infectious organism, unspecified part of lung.      Subjective   Ino Pierce is a 60 y.o. male with PMHx s/f stage IV severe COPD, chronic hypoxic hypercapnic respiratory failure on 5 L nasal cannula, cigarette dependence, CAD, HTN, HLD, HFrEF, seizure in 2016, DAV presenting with worsening shortness of breath.  Has associated headache, productive cough and generalized malaise for past few days.  He states that he is following with palliative care at [facility] for his COPD but was kicked out of the program because he could not make it to the appointments.  He notes he has been having difficulty this year and is emotional exhausted as there were four family members and two pets passed away, all in this year. He got kicked out of his house and is currently living with his nephew. He has no active suicide ideation nor plan of suicide. He is not taking any medications, has been off of his inhalers for a while. Does not know what medications he was on.  Denies lightheadedness, fever chills nausea vomiting, chest pain, leg swelling.  He states almost everyone is sick at his place right now.      ED Course (Summary - please note all labs, imaging studies, and interventions noted below have been personally reviewed and/or interpreted on day of admission):   Vitals on presentation: 98.3 F, 105 bpm, 20 RR, 144/91, 90% on 5 L nasal cannula  Labs: CMP, CBC, VBG largely unremarkable  Viral studies negative  EKG: Sinus tachycardia at 101 bpm, no acute STEMI  Imaging: CXR-new 1.2 cm pulmonary nodule concerning for potential neoplasm  CT chest AP-mild new mediastinal adenopathy and bilateral hilar adenopathy.  Mild secretion in trachea and main bronchi, bronchial wall thickening in both lower lobes consistent with acute bronchitis.  Areas of reticular nodule and infiltrate density in lower lobes consistent with Imodium.  Scattered  new area of tiny nodule and reticular nodular density in right upper and middle lobe consistent with acute bronchiolitis.  Underlying emphysema.  Aortoiliac calcifications.  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  Small right lobe liver cyst.  Previous appendectomy.  Interventions: Zithromax, Augmentin, morphine 4 mg x 2, prednisone 60 mg, admission for further management  2/13: Patient was seen and examined.  Saturating well on 6 5 L nasal cannula oxygen which is his baseline.  Continues to complain of shortness of breath.  Was also complaining of pain.  Urine Legionella and strep antigen are negative.  Will continue current antibiotics breathing treatment and steroids.  Palliative care consulted.  2/14: Patient was seen and examined.  Saturating well on 5 L nasal cannula oxygen.  Patient is admitting to home with home hospice.  Palliative care and hospice to see later today.  Psychiatry consulted will get to see.  Patient is medically stable for inpatient psychiatric evaluation/placement.  2/15: Patient feeling better seen by hospice working on discharge planning Home with home hospice.  Objective     Last Recorded Vitals  /84 (BP Location: Left arm, Patient Position: Lying)   Pulse 68   Temp 36.6 °C (97.9 °F) (Temporal)   Resp 18   Wt 52.3 kg (115 lb 4.8 oz)   SpO2 98%   Intake/Output last 3 Shifts:    Intake/Output Summary (Last 24 hours) at 2/15/2025 1316  Last data filed at 2/15/2025 1107  Gross per 24 hour   Intake 1200 ml   Output 500 ml   Net 700 ml       Admission Weight  Weight: 57.6 kg (127 lb) (02/12/25 0832)    Daily Weight  02/13/25 : 52.3 kg (115 lb 4.8 oz)    Image Results  CT chest abdomen pelvis w IV contrast  Narrative: Interpreted By:  Tyshawn Barahona,   STUDY:  CT CHEST ABDOMEN PELVIS W IV CONTRAST;  2/12/2025 10:19 am      INDICATION:  61 y/o   M with  Signs/Symptoms:left abd pain, sob.          LIMITATIONS:  None..      ACCESSION NUMBER(S):  [id number]      ORDERING  CLINICIAN:  ELIZABETH ARMENDARIZ      TECHNIQUE:  After the administration of IV nonionic contrast, images were  obtained from the thoracic inlet down through the symphysis pubis.  Sagittal and coronal reconstruction images were generated.  Mediastinal, lung, bone, and liver windows were reviewed. Omnipaque  350   75 ML      COMPARISON:  CT chest with contrast dated 03/18/2022.      FINDINGS:  CHEST FINDINGS:      CHEST WALL/BASE OF THE NECK:  The chest wall was grossly intact.  No thyromegaly or thyroid mass.      LUNGS/ PLEURA/ AND TRACHEA:  No pleural effusion. No pneumothorax.  Mild secretions along the posterior wall of the distal trachea..  There are very mild secretions along the posterior walls of each main  bronchus. There is bilateral bronchial wall thickening, most  pronounced in the right middle lobe and left lingula and the lower  lobes. There are some secretions in several segmental and  subsegmental bronchi in the lower lobes, right greater than left.  There are infiltrative densities and also reticulonodular densities  all all posteriorly in both lower lobes consistent with pneumonia,  right greater than left. There are emphysematous changes in the  lungs. There are several scattered small nodules peripherally in the  lateral and posterior right upper lobe, for example on axial image  98. These were not present previously. There are multiple tiny  nodules laterally in the inferior aspect of the right upper lobe  centered on axial image 184, not evident previously. There are also  multiple tiny peripheral nodules laterally in the right middle lobe,  for example on image 223, not present previously. There are multiple  new tiny nodules and reticulonodular densities in the superior  segment of the right lower lobe. There are new tiny nodules and  reticulonodular densities in the left lingula.      MEDIASTINUM/ARMIDA:  Development of mild mediastinal adenopathy with lymph nodes measuring  10 mm short axis or less.  Very mild bilateral hilar adenopathy. No  axillary adenopathy. No cardiomegaly.  No pericardial effusion.  There was no aneurysmal dilatation or dissection of the thoracic  aorta.      BONES:  No destructive lytic or blastic bone lesion.          ABDOMEN/PELVIS FINDINGS:      LIVER:  No hepatomegaly.  Liver density was  within the limits of normal.  There is a 6 mm cyst in segment 6 of the liver on image 122. Normal  portal vein enhancement..      GALLBLADDER:  No calcified stone, gallbladder wall thickening, or adjacent edema.      BILE DUCTS:  No intrahepatic biliary ductal dilatation.  Common bile duct was within the limits of normal.      SPLEEN:  No splenomegaly.  No splenic  mass.      PANCREAS:  No pancreatic mass or inflammation, or ductal dilatation.      KIDNEYS/ADRENALS:  No adrenal mass or enlargement.  No calcified stone, hydronephrosis, mass, or perinephric edema in  either kidney. No ureteral stone or dilatation.      BLADDER/PELVIS:  Urinary bladder was grossly intact.  No prostate enlargement.      GREAT VESSELS/RETROPERITONEUM:  There is severe stenosis of the proximal to mid SMA caused by mural  calcification and posterior wall mural thrombus. Moderate aortoiliac  calcifications. Otherwise, abdominal aorta and IVC were grossly  intact. No suspicious retroperitoneal adenopathy.  No suspicious mesenteric adenopathy.  No suspicious pelvic or inguinal adenopathy.      PERITONEUM:  No ascites.  No pneumoperitoneum.  No peritoneal or mesenteric mass or inflammation.      BOWEL:  The stomach was  grossly intact.  There was no small bowel dilatation or small bowel wall thickening.  No small-bowel obstruction. Mild scattered retained colonic stool and  gas. There was no colonic wall thickening or large bowel obstruction.  No edema adjacent to the colon. The cecal appendix was surgically  absent..      BONES:  No destructive lytic or blastic bone lesion. Bilateral L4 pars  defects. Grade 1 anterolisthesis  of L4 over L5. Mild-to-moderate disc  space narrowing with endplate spurring at that level. Inter facet  hypertrophic arthritic changes at L5-S1 with mild disc space  narrowing at that level. There is mild endplate osteophytosis at L1-2  and L2-3. Slight disc space narrowing at L1-2. Incidental note of  partial sacralization of the left L5 transverse process. Mild  sclerotic arthritic changes in both SI joints.      ABDOMINAL WALL:  Unremarkable.      Impression: CHEST:  Mild new mediastinal adenopathy and very mild bilateral hilar  adenopathy, most likely infectious/inflammatory.      Mild secretions in the trachea and main bronchi as described, in  addition to secretions evident in several segmental/subsegmental  bronchi in the lower lobes. In addition, there is bronchial wall  thickening in both lower lobes and to a lesser extent the right  middle lobe and left lingula consistent with acute bronchitis.  Finally, there are areas of reticulonodular and more confluent  infiltrative density in the lower lobes as described, right greater  than left, consistent with pneumonia. There are also scattered new  areas of tiny nodules and reticulonodular densities in the right  upper lobe and right middle lobe, superior segment of the right lower  lobe, and in the left lingula consistent with acute bronchiolitis.      Underlying emphysema.          ABDOMEN/PELVIS:  Arthritic changes in the lumbosacral spine as described. No CT  evidence of fracture of the lumbar spine or pelvis in this exam.      Aortoiliac calcifications. Severe stenosis of the proximal to mid SMA  as described. Please see above for details.      Small right lobe liver cyst.      Previous appendectomy.      MACRO:  None      Signed by: Tyshawn Barahona 2/12/2025 10:46 AM  Dictation workstation:   [id number]  Electrocardiogram, 12-lead  Sinus tachycardia  Biatrial enlargement  ST elevation, consider inferior injury    See ED provider note for full  interpretation and clinical correlation  Confirmed by Sandy Valles ([id number]) on 2/12/2025 9:56:47 AM  XR chest 1 view  Narrative: Interpreted By:  Yan Almaraz,   STUDY:  XR CHEST 1 VIEW      INDICATION:  Signs/Symptoms:sob.      COMPARISON:  March 18, 2022      ACCESSION NUMBER(S):  [id number]      ORDERING CLINICIAN:  ELIZABETH ARMENDARIZ      FINDINGS:  1.2 cm suspected right lower lobe pulmonary nodule new from prior  study. This raises concern for possible neoplasm.      COPD changes have worsened in the interval with more flattening of  the diaphragms and more coarse interstitial markings.      No consolidation, effusion, edema, pneumothorax.      Impression: New 1.2 cm pulmonary nodule raising concern for potential neoplasm.  CT of the thorax recommended for further evaluation.      Worsening COPD.      MACRO:  Critical Finding:  See findings. Notification was initiated on  2/12/2025 at 9:45 am by  Yan Almaraz.  (**-YCF-**) Instructions:      Signed by: Yan Almaraz 2/12/2025 9:47 AM  Dictation workstation:   [id number]      Physical Exam  Vitals:    02/15/25 1147   BP:    Pulse:    Resp:    Temp:    SpO2: 98%     Constitutional: Pleasant and cooperative. Laying in bed in no acute distress. Conversant.   Skin: Warm and dry; no obvious lesions, rashes, pallor, or jaundice.   Eyes: EOMI. Anicteric sclera.   ENT: Mucous membranes moist; no obvious injury or deformity appreciated.   Head and Neck: Normocephalic, atraumatic. ROM preserved. Trachea midline. No appreciable JVD.   Respiratory: Patient breathing better respiratory status is improved on 5 L nasal cannula   cardiovascular: RRR. No gross murmur, gallop, or rub. Extremities are warm and well-perfused with good capillary refill (< 3 seconds). No chest wall tenderness.   GI: Abdomen soft, tender with minimal palpation diffuse throughout, nondistended. No obvious organomegaly appreciated. Bowel sounds are present.  : No CVA tenderness.   MSK: No gross  abnormalities appreciated. No limitations to AROM/PROM appreciated.   Extremities: No cyanosis, edema, or clubbing evident. Neurovascularly intact.   Neuro: A&Ox3. CN 2-12 grossly intact. Able to respond to questions appropriately and clearly. No acute focal neurologic deficits appreciated.  Psych: Depressed.   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     Malnutrition Diagnosis Status: New  Malnutrition Diagnosis: Severe malnutrition related to chronic disease or condition  Related to: COPD metabolism  As Evidenced by: moderate subcutaneous fat, muscle loss per NFPE, >10% weight loss x 6 months  I agree with the dietitian's malnutrition diagnosis.         Spent 35 minutes in the follow-up management of this patient today    Mingo Avila MD

## 2025-02-15 NOTE — NURSING NOTE
RN Hospice Note    Ino Pierce is a pending Hospice Patient.   Hospice terminal diagnosis: COPD  Physician: Dr. Avila  Visit type: Initial    Comments/recommendations: Met w pt and explained hospice philosophy, services which he readily agreed to.  Pt lives w/ nephew, feels he may need Nh placement in future, signed consents for hospice admit upon hospital discharge.  Identified needed DME. Which will be ordered.  T/c to pt's nephew, Jose F, and son, Naga.  Left VM's requesting c/b to discuss timing of discharge and delivery of DME.      Discharge Planning:  Patient to be discharged to home    The following is to be completed:  Discharge order: at time of d/c  State DNR signed by MD: completed in hospice consent [facility]  Nursing facility referral/transfer form: n/a  Medication reconciliation: TBD  PAS/RR or convalescent stay form: n/a  Prescriptions for al narcotics/new medications: needed at time of d/c  Transportation: tba  Other: awaiting callbacks from family    Plan of care reviewed with patient/family members - awaiting contact   Plan of care reviewed with hospital staff members: Ep[ci chat sent to Dr. Avila and Mary Becerril, floor RN     Please notify [facility] of any changes in condition. Thank you.  Office: [phone] (8 am-6:30 pm M-F and 8 am-4:30 pm weekends and holidays)   [phone] (6:30 pm-8 am M-F and 4:30 pm-8 am weekends and holidays)    SYLVIE Martin LISW-S

## 2025-02-15 NOTE — CARE PLAN
Problem: Safety - Adult  Goal: Free from fall injury  Outcome: Progressing     Problem: Discharge Planning  Goal: Discharge to home or other facility with appropriate resources  Outcome: Progressing     Problem: Chronic Conditions and Co-morbidities  Goal: Patient's chronic conditions and co-morbidity symptoms are monitored and maintained or improved  Outcome: Progressing     Problem: Nutrition  Goal: Nutrient intake appropriate for maintaining nutritional needs  Outcome: Progressing     Problem: Skin  Goal: Decreased wound size/increased tissue granulation at next dressing change  Outcome: Progressing  Flowsheets (Taken 2/15/2025 0900)  Decreased wound size/increased tissue granulation at next dressing change: Promote sleep for wound healing  Goal: Participates in plan/prevention/treatment measures  Outcome: Progressing  Flowsheets (Taken 2/15/2025 0900)  Participates in plan/prevention/treatment measures:   Discuss with provider PT/OT consult   Increase activity/out of bed for meals  Goal: Prevent/manage excess moisture  Outcome: Progressing  Flowsheets (Taken 2/15/2025 0900)  Prevent/manage excess moisture: Follow provider orders for dressing changes  Goal: Prevent/minimize sheer/friction injuries  Outcome: Progressing  Flowsheets (Taken 2/15/2025 0900)  Prevent/minimize sheer/friction injuries:   Increase activity/out of bed for meals   Use pull sheet  Goal: Promote/optimize nutrition  Outcome: Progressing  Flowsheets (Taken 2/15/2025 0900)  Promote/optimize nutrition:   Offer water/supplements/favorite foods   Consume > 50% meals/supplements   Monitor/record intake including meals  Goal: Promote skin healing  Outcome: Progressing     Problem: Fall/Injury  Goal: Not fall by end of shift  Outcome: Progressing  Goal: Be free from injury by end of the shift  Outcome: Progressing  Goal: Verbalize understanding of personal risk factors for fall in the hospital  Outcome: Progressing  Goal: Verbalize understanding of  risk factor reduction measures to prevent injury from fall in the home  Outcome: Progressing  Goal: Use assistive devices by end of the shift  Outcome: Progressing  Goal: Pace activities to prevent fatigue by end of the shift  Outcome: Progressing     Problem: Pain  Goal: Takes deep breaths with improved pain control throughout the shift  Outcome: Progressing  Goal: Turns in bed with improved pain control throughout the shift  Outcome: Progressing  Goal: Walks with improved pain control throughout the shift  Outcome: Progressing  Goal: Performs ADL's with improved pain control throughout shift  Outcome: Progressing  Goal: Participates in PT with improved pain control throughout the shift  Outcome: Progressing  Goal: Free from opioid side effects throughout the shift  Outcome: Progressing  Goal: Free from acute confusion related to pain meds throughout the shift  Outcome: Progressing     Problem: Risk for Suicide  Goal: Accepts medications as prescribed/needed this shift  Outcome: Progressing  Goal: Identifies supports this shift  Outcome: Progressing  Goal: Makes needs known through verbalization or behaviors this shift  Outcome: Progressing  Goal: No self harm this shift  Outcome: Progressing  Goal: Read Safety Guidelines this shift  Outcome: Progressing  Goal: Complete Mental Health Safety Plan (psychiatry only) this shift  Outcome: Progressing       The clinical goals for the shift include patient will have no falls or injuries throughout this shift.

## 2025-02-16 VITALS
RESPIRATION RATE: 16 BRPM | SYSTOLIC BLOOD PRESSURE: 135 MMHG | HEIGHT: 66 IN | TEMPERATURE: 97 F | HEART RATE: 57 BPM | WEIGHT: 115.3 LBS | OXYGEN SATURATION: 100 % | DIASTOLIC BLOOD PRESSURE: 78 MMHG | BODY MASS INDEX: 18.53 KG/M2

## 2025-02-16 LAB
ANION GAP SERPL CALC-SCNC: 11 MMOL/L (ref 10–20)
BASOPHILS # BLD AUTO: 0.03 X10*3/UL (ref 0–0.1)
BASOPHILS NFR BLD AUTO: 0.4 %
BUN SERPL-MCNC: 17 MG/DL (ref 6–23)
CALCIUM SERPL-MCNC: 7.7 MG/DL (ref 8.6–10.3)
CHLORIDE SERPL-SCNC: 101 MMOL/L (ref 98–107)
CO2 SERPL-SCNC: 31 MMOL/L (ref 21–32)
CREAT SERPL-MCNC: 0.7 MG/DL (ref 0.5–1.3)
EGFRCR SERPLBLD CKD-EPI 2021: >90 ML/MIN/1.73M*2
EOSINOPHIL # BLD AUTO: 0.17 X10*3/UL (ref 0–0.7)
EOSINOPHIL NFR BLD AUTO: 2.3 %
ERYTHROCYTE [DISTWIDTH] IN BLOOD BY AUTOMATED COUNT: 12.3 % (ref 11.5–14.5)
GLUCOSE SERPL-MCNC: 84 MG/DL (ref 74–99)
HCT VFR BLD AUTO: 33.9 % (ref 41–52)
HGB BLD-MCNC: 10.8 G/DL (ref 13.5–17.5)
IMM GRANULOCYTES # BLD AUTO: 0.03 X10*3/UL (ref 0–0.7)
IMM GRANULOCYTES NFR BLD AUTO: 0.4 % (ref 0–0.9)
LYMPHOCYTES # BLD AUTO: 1.45 X10*3/UL (ref 1.2–4.8)
LYMPHOCYTES NFR BLD AUTO: 19.5 %
MCH RBC QN AUTO: 27.1 PG (ref 26–34)
MCHC RBC AUTO-ENTMCNC: 31.9 G/DL (ref 32–36)
MCV RBC AUTO: 85 FL (ref 80–100)
MONOCYTES # BLD AUTO: 0.5 X10*3/UL (ref 0.1–1)
MONOCYTES NFR BLD AUTO: 6.7 %
NEUTROPHILS # BLD AUTO: 5.27 X10*3/UL (ref 1.2–7.7)
NEUTROPHILS NFR BLD AUTO: 70.7 %
NRBC BLD-RTO: 0 /100 WBCS (ref 0–0)
PLATELET # BLD AUTO: 284 X10*3/UL (ref 150–450)
POTASSIUM SERPL-SCNC: 4.2 MMOL/L (ref 3.5–5.3)
RBC # BLD AUTO: 3.98 X10*6/UL (ref 4.5–5.9)
SODIUM SERPL-SCNC: 139 MMOL/L (ref 136–145)
WBC # BLD AUTO: 7.5 X10*3/UL (ref 4.4–11.3)

## 2025-02-16 PROCEDURE — 2500000004 HC RX 250 GENERAL PHARMACY W/ HCPCS (ALT 636 FOR OP/ED): Performed by: INTERNAL MEDICINE

## 2025-02-16 PROCEDURE — 2500000001 HC RX 250 WO HCPCS SELF ADMINISTERED DRUGS (ALT 637 FOR MEDICARE OP): Performed by: REGISTERED NURSE

## 2025-02-16 PROCEDURE — 2500000001 HC RX 250 WO HCPCS SELF ADMINISTERED DRUGS (ALT 637 FOR MEDICARE OP)

## 2025-02-16 PROCEDURE — 2500000002 HC RX 250 W HCPCS SELF ADMINISTERED DRUGS (ALT 637 FOR MEDICARE OP, ALT 636 FOR OP/ED)

## 2025-02-16 PROCEDURE — 99233 SBSQ HOSP IP/OBS HIGH 50: CPT | Performed by: INTERNAL MEDICINE

## 2025-02-16 PROCEDURE — 80048 BASIC METABOLIC PNL TOTAL CA: CPT | Performed by: INTERNAL MEDICINE

## 2025-02-16 PROCEDURE — 85025 COMPLETE CBC W/AUTO DIFF WBC: CPT | Performed by: INTERNAL MEDICINE

## 2025-02-16 PROCEDURE — 36415 COLL VENOUS BLD VENIPUNCTURE: CPT | Performed by: INTERNAL MEDICINE

## 2025-02-16 PROCEDURE — 2500000001 HC RX 250 WO HCPCS SELF ADMINISTERED DRUGS (ALT 637 FOR MEDICARE OP): Performed by: INTERNAL MEDICINE

## 2025-02-16 PROCEDURE — 94640 AIRWAY INHALATION TREATMENT: CPT

## 2025-02-16 PROCEDURE — 2500000004 HC RX 250 GENERAL PHARMACY W/ HCPCS (ALT 636 FOR OP/ED)

## 2025-02-16 PROCEDURE — 1210000001 HC SEMI-PRIVATE ROOM DAILY

## 2025-02-16 PROCEDURE — 94668 MNPJ CHEST WALL SBSQ: CPT

## 2025-02-16 RX ORDER — BISACODYL 5 MG
10 TABLET, DELAYED RELEASE (ENTERIC COATED) ORAL ONCE
Status: DISCONTINUED | OUTPATIENT
Start: 2025-02-16 | End: 2025-02-18 | Stop reason: HOSPADM

## 2025-02-16 RX ORDER — LORAZEPAM 0.5 MG/1
0.5 TABLET ORAL 2 TIMES DAILY
Qty: 30 TABLET | Refills: 0 | Status: CANCELLED | OUTPATIENT
Start: 2025-02-16

## 2025-02-16 RX ORDER — GUAIFENESIN 1200 MG/1
1200 TABLET, EXTENDED RELEASE ORAL 2 TIMES DAILY
Qty: 20 TABLET | Refills: 0 | Status: CANCELLED | OUTPATIENT
Start: 2025-02-16

## 2025-02-16 RX ORDER — GUAIFENESIN 600 MG/1
1200 TABLET, EXTENDED RELEASE ORAL 2 TIMES DAILY
Status: DISCONTINUED | OUTPATIENT
Start: 2025-02-16 | End: 2025-02-18 | Stop reason: HOSPADM

## 2025-02-16 RX ORDER — AZITHROMYCIN 250 MG/1
500 TABLET, FILM COATED ORAL DAILY
Status: DISCONTINUED | OUTPATIENT
Start: 2025-02-16 | End: 2025-02-18 | Stop reason: HOSPADM

## 2025-02-16 RX ORDER — ADHESIVE BANDAGE
45 BANDAGE TOPICAL ONCE
Status: DISCONTINUED | OUTPATIENT
Start: 2025-02-16 | End: 2025-02-18 | Stop reason: HOSPADM

## 2025-02-16 RX ADMIN — LORAZEPAM 0.5 MG: 0.5 TABLET ORAL at 17:56

## 2025-02-16 RX ADMIN — IPRATROPIUM BROMIDE AND ALBUTEROL SULFATE 3 ML: 2.5; .5 SOLUTION RESPIRATORY (INHALATION) at 07:17

## 2025-02-16 RX ADMIN — IPRATROPIUM BROMIDE AND ALBUTEROL SULFATE 3 ML: 2.5; .5 SOLUTION RESPIRATORY (INHALATION) at 15:44

## 2025-02-16 RX ADMIN — MORPHINE SULFATE 15 MG: 15 TABLET ORAL at 17:53

## 2025-02-16 RX ADMIN — IPRATROPIUM BROMIDE AND ALBUTEROL SULFATE 3 ML: 2.5; .5 SOLUTION RESPIRATORY (INHALATION) at 12:00

## 2025-02-16 RX ADMIN — AZITHROMYCIN MONOHYDRATE 500 MG: 500 INJECTION, POWDER, LYOPHILIZED, FOR SOLUTION INTRAVENOUS at 09:09

## 2025-02-16 RX ADMIN — TIOTROPIUM BROMIDE INHALATION SPRAY 2 PUFF: 3.12 SPRAY, METERED RESPIRATORY (INHALATION) at 06:00

## 2025-02-16 RX ADMIN — IPRATROPIUM BROMIDE AND ALBUTEROL SULFATE 3 ML: 2.5; .5 SOLUTION RESPIRATORY (INHALATION) at 20:30

## 2025-02-16 RX ADMIN — PANTOPRAZOLE SODIUM 40 MG: 40 TABLET, DELAYED RELEASE ORAL at 05:59

## 2025-02-16 RX ADMIN — ASPIRIN 81 MG: 81 TABLET, COATED ORAL at 09:13

## 2025-02-16 RX ADMIN — DEXAMETHASONE SODIUM PHOSPHATE 4 MG: 4 INJECTION, SOLUTION INTRAMUSCULAR; INTRAVENOUS at 21:47

## 2025-02-16 RX ADMIN — LORAZEPAM 0.5 MG: 0.5 TABLET ORAL at 04:48

## 2025-02-16 RX ADMIN — FLUTICASONE FUROATE AND VILANTEROL TRIFENATATE 1 PUFF: 200; 25 POWDER RESPIRATORY (INHALATION) at 05:59

## 2025-02-16 RX ADMIN — METOPROLOL SUCCINATE 50 MG: 50 TABLET, EXTENDED RELEASE ORAL at 09:13

## 2025-02-16 RX ADMIN — MORPHINE SULFATE 15 MG: 15 TABLET ORAL at 05:58

## 2025-02-16 RX ADMIN — ATORVASTATIN CALCIUM 80 MG: 40 TABLET, FILM COATED ORAL at 05:59

## 2025-02-16 RX ADMIN — MORPHINE SULFATE 15 MG: 15 TABLET ORAL at 11:59

## 2025-02-16 RX ADMIN — GUAIFENESIN 1200 MG: 600 TABLET ORAL at 21:47

## 2025-02-16 RX ADMIN — ESCITALOPRAM OXALATE 20 MG: 10 TABLET ORAL at 09:12

## 2025-02-16 ASSESSMENT — PAIN SCALES - GENERAL
PAINLEVEL_OUTOF10: 9
PAINLEVEL_OUTOF10: 3
PAINLEVEL_OUTOF10: 7
PAINLEVEL_OUTOF10: 3

## 2025-02-16 ASSESSMENT — COGNITIVE AND FUNCTIONAL STATUS - GENERAL
DRESSING REGULAR UPPER BODY CLOTHING: A LITTLE
DAILY ACTIVITIY SCORE: 20
DRESSING REGULAR LOWER BODY CLOTHING: A LITTLE
MOBILITY SCORE: 19
TOILETING: A LITTLE
WALKING IN HOSPITAL ROOM: A LITTLE
CLIMB 3 TO 5 STEPS WITH RAILING: A LOT
DAILY ACTIVITIY SCORE: 20
HELP NEEDED FOR BATHING: A LITTLE
STANDING UP FROM CHAIR USING ARMS: A LITTLE
MOVING TO AND FROM BED TO CHAIR: A LITTLE
MOVING TO AND FROM BED TO CHAIR: A LITTLE
TOILETING: A LITTLE
MOBILITY SCORE: 19
CLIMB 3 TO 5 STEPS WITH RAILING: A LOT
STANDING UP FROM CHAIR USING ARMS: A LITTLE
DRESSING REGULAR UPPER BODY CLOTHING: A LITTLE
WALKING IN HOSPITAL ROOM: A LITTLE
DRESSING REGULAR LOWER BODY CLOTHING: A LITTLE
HELP NEEDED FOR BATHING: A LITTLE

## 2025-02-16 ASSESSMENT — PAIN DESCRIPTION - LOCATION
LOCATION: BACK
LOCATION: BACK

## 2025-02-16 ASSESSMENT — PAIN - FUNCTIONAL ASSESSMENT
PAIN_FUNCTIONAL_ASSESSMENT: 0-10

## 2025-02-16 ASSESSMENT — PAIN DESCRIPTION - ORIENTATION
ORIENTATION: LOWER
ORIENTATION: LOWER

## 2025-02-16 NOTE — PROGRESS NOTES
SW notified via [facility] from [facility], pt unable to return home w/ hospice at discharge d/t multiple family member's in home being sick and pt's family inquiring about NH placement w/ hospice. SW attempted to call pt's brother, Naga, to discuss further, no answer, LVM. SW messaged [facility] via [facility] about potential respite stay while [facility] medicaid is worked on for NH placement. SW to follow.     SYLVIE Jaquez ([id number])   Care Transitions

## 2025-02-16 NOTE — CARE PLAN
The patient's goals for the shift include      The clinical goals for the shift include pt will be free from fall or injury throughout shift    Problem: Safety - Adult  Goal: Free from fall injury  Outcome: Progressing     Problem: Discharge Planning  Goal: Discharge to home or other facility with appropriate resources  Outcome: Progressing     Problem: Chronic Conditions and Co-morbidities  Goal: Patient's chronic conditions and co-morbidity symptoms are monitored and maintained or improved  Outcome: Progressing     Problem: Nutrition  Goal: Nutrient intake appropriate for maintaining nutritional needs  Outcome: Progressing     Problem: Skin  Goal: Decreased wound size/increased tissue granulation at next dressing change  Outcome: Progressing  Flowsheets (Taken 2/16/2025 0048)  Decreased wound size/increased tissue granulation at next dressing change: Promote sleep for wound healing  Goal: Participates in plan/prevention/treatment measures  Outcome: Progressing  Flowsheets (Taken 2/16/2025 0048)  Participates in plan/prevention/treatment measures:   Discuss with provider PT/OT consult   Elevate heels  Goal: Prevent/manage excess moisture  Outcome: Progressing  Flowsheets (Taken 2/16/2025 0048)  Prevent/manage excess moisture:   Cleanse incontinence/protect with barrier cream   Moisturize dry skin  Goal: Prevent/minimize sheer/friction injuries  Outcome: Progressing  Flowsheets (Taken 2/16/2025 0048)  Prevent/minimize sheer/friction injuries:   Increase activity/out of bed for meals   Use pull sheet  Goal: Promote/optimize nutrition  Outcome: Progressing  Flowsheets (Taken 2/16/2025 0048)  Promote/optimize nutrition: Monitor/record intake including meals  Goal: Promote skin healing  Outcome: Progressing  Flowsheets (Taken 2/16/2025 0048)  Promote skin healing:   Assess skin/pad under line(s)/device(s)   Rotate device position/do not position patient on device     Problem: Fall/Injury  Goal: Not fall by end of  shift  Outcome: Progressing  Goal: Be free from injury by end of the shift  Outcome: Progressing  Goal: Verbalize understanding of personal risk factors for fall in the hospital  Outcome: Progressing  Goal: Verbalize understanding of risk factor reduction measures to prevent injury from fall in the home  Outcome: Progressing  Goal: Use assistive devices by end of the shift  Outcome: Progressing  Goal: Pace activities to prevent fatigue by end of the shift  Outcome: Progressing     Problem: Pain  Goal: Takes deep breaths with improved pain control throughout the shift  Outcome: Progressing  Goal: Turns in bed with improved pain control throughout the shift  Outcome: Progressing  Goal: Walks with improved pain control throughout the shift  Outcome: Progressing  Goal: Performs ADL's with improved pain control throughout shift  Outcome: Progressing  Goal: Participates in PT with improved pain control throughout the shift  Outcome: Progressing  Goal: Free from opioid side effects throughout the shift  Outcome: Progressing  Goal: Free from acute confusion related to pain meds throughout the shift  Outcome: Progressing     Problem: Risk for Suicide  Goal: Accepts medications as prescribed/needed this shift  Outcome: Progressing  Goal: Identifies supports this shift  Outcome: Progressing  Goal: Makes needs known through verbalization or behaviors this shift  Outcome: Progressing  Goal: No self harm this shift  Outcome: Progressing  Goal: Read Safety Guidelines this shift  Outcome: Progressing  Goal: Complete Mental Health Safety Plan (psychiatry only) this shift  Outcome: Progressing

## 2025-02-16 NOTE — CARE PLAN
Problem: Safety - Adult  Goal: Free from fall injury  Outcome: Progressing     Problem: Discharge Planning  Goal: Discharge to home or other facility with appropriate resources  Outcome: Progressing     Problem: Chronic Conditions and Co-morbidities  Goal: Patient's chronic conditions and co-morbidity symptoms are monitored and maintained or improved  Outcome: Progressing     Problem: Nutrition  Goal: Nutrient intake appropriate for maintaining nutritional needs  Outcome: Progressing     Problem: Skin  Goal: Decreased wound size/increased tissue granulation at next dressing change  Outcome: Progressing  Flowsheets (Taken 2/16/2025 0923)  Decreased wound size/increased tissue granulation at next dressing change: Promote sleep for wound healing  Goal: Participates in plan/prevention/treatment measures  Outcome: Progressing  Flowsheets (Taken 2/16/2025 0923)  Participates in plan/prevention/treatment measures:   Discuss with provider PT/OT consult   Increase activity/out of bed for meals  Goal: Prevent/manage excess moisture  Outcome: Progressing  Goal: Prevent/minimize sheer/friction injuries  Outcome: Progressing  Flowsheets (Taken 2/16/2025 0923)  Prevent/minimize sheer/friction injuries:   Increase activity/out of bed for meals   Turn/reposition every 2 hours/use positioning/transfer devices   Use pull sheet  Goal: Promote/optimize nutrition  Outcome: Progressing  Flowsheets (Taken 2/16/2025 0923)  Promote/optimize nutrition:   Offer water/supplements/favorite foods   Consume > 50% meals/supplements   Monitor/record intake including meals  Goal: Promote skin healing  Outcome: Progressing     Problem: Fall/Injury  Goal: Not fall by end of shift  Outcome: Progressing  Goal: Be free from injury by end of the shift  Outcome: Progressing  Goal: Verbalize understanding of personal risk factors for fall in the hospital  Outcome: Progressing  Goal: Verbalize understanding of risk factor reduction measures to prevent  injury from fall in the home  Outcome: Progressing  Goal: Use assistive devices by end of the shift  Outcome: Progressing  Goal: Pace activities to prevent fatigue by end of the shift  Outcome: Progressing     Problem: Pain  Goal: Takes deep breaths with improved pain control throughout the shift  Outcome: Progressing  Goal: Turns in bed with improved pain control throughout the shift  Outcome: Progressing  Goal: Walks with improved pain control throughout the shift  Outcome: Progressing  Goal: Performs ADL's with improved pain control throughout shift  Outcome: Progressing  Goal: Participates in PT with improved pain control throughout the shift  Outcome: Progressing  Goal: Free from opioid side effects throughout the shift  Outcome: Progressing  Goal: Free from acute confusion related to pain meds throughout the shift  Outcome: Progressing        The clinical goals for the shift include patient will have adequate pain control throughout this shift.

## 2025-02-16 NOTE — PROGRESS NOTES
Ino Pierce is a 60 y.o. male on day 3 of admission presenting with Pneumonia of both lungs due to infectious organism, unspecified part of lung.      Subjective   Ino Pierce is a 60 y.o. male with PMHx s/f stage IV severe COPD, chronic hypoxic hypercapnic respiratory failure on 5 L nasal cannula, cigarette dependence, CAD, HTN, HLD, HFrEF, seizure in 2016, DAV presenting with worsening shortness of breath.  Has associated headache, productive cough and generalized malaise for past few days.  He states that he is following with palliative care at [facility] for his COPD but was kicked out of the program because he could not make it to the appointments.  He notes he has been having difficulty this year and is emotional exhausted as there were four family members and two pets passed away, all in this year. He got kicked out of his house and is currently living with his nephew. He has no active suicide ideation nor plan of suicide. He is not taking any medications, has been off of his inhalers for a while. Does not know what medications he was on.  Denies lightheadedness, fever chills nausea vomiting, chest pain, leg swelling.  He states almost everyone is sick at his place right now.      ED Course (Summary - please note all labs, imaging studies, and interventions noted below have been personally reviewed and/or interpreted on day of admission):   Vitals on presentation: 98.3 F, 105 bpm, 20 RR, 144/91, 90% on 5 L nasal cannula  Labs: CMP, CBC, VBG largely unremarkable  Viral studies negative  EKG: Sinus tachycardia at 101 bpm, no acute STEMI  Imaging: CXR-new 1.2 cm pulmonary nodule concerning for potential neoplasm  CT chest AP-mild new mediastinal adenopathy and bilateral hilar adenopathy.  Mild secretion in trachea and main bronchi, bronchial wall thickening in both lower lobes consistent with acute bronchitis.  Areas of reticular nodule and infiltrate density in lower lobes consistent with Imodium.  Scattered  new area of tiny nodule and reticular nodular density in right upper and middle lobe consistent with acute bronchiolitis.  Underlying emphysema.  Aortoiliac calcifications.  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  Small right lobe liver cyst.  Previous appendectomy.  Interventions: Zithromax, Augmentin, morphine 4 mg x 2, prednisone 60 mg, admission for further management  2/13: Patient was seen and examined.  Saturating well on 6 5 L nasal cannula oxygen which is his baseline.  Continues to complain of shortness of breath.  Was also complaining of pain.  Urine Legionella and strep antigen are negative.  Will continue current antibiotics breathing treatment and steroids.  Palliative care consulted.  2/14: Patient was seen and examined.  Saturating well on 5 L nasal cannula oxygen.  Patient is admitting to home with home hospice.  Palliative care and hospice to see later today.  Psychiatry consulted will get to see.  Patient is medically stable for inpatient psychiatric evaluation/placement.  2/15: Patient feeling better seen by hospice working on discharge planning Home with home hospice.  2/16: Trying to work out with case management social worker discharge with hospice.  Family states he cannot come home at this point because they are so sick at home.  Did talk to  about respite so that we do not have to wait as long for authorization.  Clinically stable for discharge with hospice but health is very tenuous..  Will add IV steroids.  Will finish up with the oral taper on discharge.  Objective     Last Recorded Vitals  /60 (BP Location: Right arm, Patient Position: Sitting)   Pulse 75   Temp 36.3 °C (97.4 °F) (Temporal)   Resp 16   Wt 52.3 kg (115 lb 4.8 oz)   SpO2 96%   Intake/Output last 3 Shifts:    Intake/Output Summary (Last 24 hours) at 2/16/2025 1831  Last data filed at 2/16/2025 1300  Gross per 24 hour   Intake 850 ml   Output 650 ml   Net 200 ml       Admission Weight  Weight: 57.6  kg (127 lb) (02/12/25 0832)    Daily Weight  02/13/25 : 52.3 kg (115 lb 4.8 oz)    Image Results  CT chest abdomen pelvis w IV contrast  Narrative: Interpreted By:  Tyshawn Barahona,   STUDY:  CT CHEST ABDOMEN PELVIS W IV CONTRAST;  2/12/2025 10:19 am      INDICATION:  61 y/o   M with  Signs/Symptoms:left abd pain, sob.          LIMITATIONS:  None..      ACCESSION NUMBER(S):  [id number]      ORDERING CLINICIAN:  ELIZABETH ARMENDARIZ      TECHNIQUE:  After the administration of IV nonionic contrast, images were  obtained from the thoracic inlet down through the symphysis pubis.  Sagittal and coronal reconstruction images were generated.  Mediastinal, lung, bone, and liver windows were reviewed. Omnipaque  350   75 ML      COMPARISON:  CT chest with contrast dated 03/18/2022.      FINDINGS:  CHEST FINDINGS:      CHEST WALL/BASE OF THE NECK:  The chest wall was grossly intact.  No thyromegaly or thyroid mass.      LUNGS/ PLEURA/ AND TRACHEA:  No pleural effusion. No pneumothorax.  Mild secretions along the posterior wall of the distal trachea..  There are very mild secretions along the posterior walls of each main  bronchus. There is bilateral bronchial wall thickening, most  pronounced in the right middle lobe and left lingula and the lower  lobes. There are some secretions in several segmental and  subsegmental bronchi in the lower lobes, right greater than left.  There are infiltrative densities and also reticulonodular densities  all all posteriorly in both lower lobes consistent with pneumonia,  right greater than left. There are emphysematous changes in the  lungs. There are several scattered small nodules peripherally in the  lateral and posterior right upper lobe, for example on axial image  98. These were not present previously. There are multiple tiny  nodules laterally in the inferior aspect of the right upper lobe  centered on axial image 184, not evident previously. There are also  multiple tiny peripheral nodules  laterally in the right middle lobe,  for example on image 223, not present previously. There are multiple  new tiny nodules and reticulonodular densities in the superior  segment of the right lower lobe. There are new tiny nodules and  reticulonodular densities in the left lingula.      MEDIASTINUM/ARMIDA:  Development of mild mediastinal adenopathy with lymph nodes measuring  10 mm short axis or less. Very mild bilateral hilar adenopathy. No  axillary adenopathy. No cardiomegaly.  No pericardial effusion.  There was no aneurysmal dilatation or dissection of the thoracic  aorta.      BONES:  No destructive lytic or blastic bone lesion.          ABDOMEN/PELVIS FINDINGS:      LIVER:  No hepatomegaly.  Liver density was  within the limits of normal.  There is a 6 mm cyst in segment 6 of the liver on image 122. Normal  portal vein enhancement..      GALLBLADDER:  No calcified stone, gallbladder wall thickening, or adjacent edema.      BILE DUCTS:  No intrahepatic biliary ductal dilatation.  Common bile duct was within the limits of normal.      SPLEEN:  No splenomegaly.  No splenic  mass.      PANCREAS:  No pancreatic mass or inflammation, or ductal dilatation.      KIDNEYS/ADRENALS:  No adrenal mass or enlargement.  No calcified stone, hydronephrosis, mass, or perinephric edema in  either kidney. No ureteral stone or dilatation.      BLADDER/PELVIS:  Urinary bladder was grossly intact.  No prostate enlargement.      GREAT VESSELS/RETROPERITONEUM:  There is severe stenosis of the proximal to mid SMA caused by mural  calcification and posterior wall mural thrombus. Moderate aortoiliac  calcifications. Otherwise, abdominal aorta and IVC were grossly  intact. No suspicious retroperitoneal adenopathy.  No suspicious mesenteric adenopathy.  No suspicious pelvic or inguinal adenopathy.      PERITONEUM:  No ascites.  No pneumoperitoneum.  No peritoneal or mesenteric mass or inflammation.      BOWEL:  The stomach was  grossly  intact.  There was no small bowel dilatation or small bowel wall thickening.  No small-bowel obstruction. Mild scattered retained colonic stool and  gas. There was no colonic wall thickening or large bowel obstruction.  No edema adjacent to the colon. The cecal appendix was surgically  absent..      BONES:  No destructive lytic or blastic bone lesion. Bilateral L4 pars  defects. Grade 1 anterolisthesis of L4 over L5. Mild-to-moderate disc  space narrowing with endplate spurring at that level. Inter facet  hypertrophic arthritic changes at L5-S1 with mild disc space  narrowing at that level. There is mild endplate osteophytosis at L1-2  and L2-3. Slight disc space narrowing at L1-2. Incidental note of  partial sacralization of the left L5 transverse process. Mild  sclerotic arthritic changes in both SI joints.      ABDOMINAL WALL:  Unremarkable.      Impression: CHEST:  Mild new mediastinal adenopathy and very mild bilateral hilar  adenopathy, most likely infectious/inflammatory.      Mild secretions in the trachea and main bronchi as described, in  addition to secretions evident in several segmental/subsegmental  bronchi in the lower lobes. In addition, there is bronchial wall  thickening in both lower lobes and to a lesser extent the right  middle lobe and left lingula consistent with acute bronchitis.  Finally, there are areas of reticulonodular and more confluent  infiltrative density in the lower lobes as described, right greater  than left, consistent with pneumonia. There are also scattered new  areas of tiny nodules and reticulonodular densities in the right  upper lobe and right middle lobe, superior segment of the right lower  lobe, and in the left lingula consistent with acute bronchiolitis.      Underlying emphysema.          ABDOMEN/PELVIS:  Arthritic changes in the lumbosacral spine as described. No CT  evidence of fracture of the lumbar spine or pelvis in this exam.      Aortoiliac calcifications.  Severe stenosis of the proximal to mid SMA  as described. Please see above for details.      Small right lobe liver cyst.      Previous appendectomy.      MACRO:  None      Signed by: Tyshawn Barahona 2/12/2025 10:46 AM  Dictation workstation:   [id number]  Electrocardiogram, 12-lead  Sinus tachycardia  Biatrial enlargement  ST elevation, consider inferior injury    See ED provider note for full interpretation and clinical correlation  Confirmed by Sandy Valles ([id number]) on 2/12/2025 9:56:47 AM  XR chest 1 view  Narrative: Interpreted By:  Yan Almaraz,   STUDY:  XR CHEST 1 VIEW      INDICATION:  Signs/Symptoms:sob.      COMPARISON:  March 18, 2022      ACCESSION NUMBER(S):  [id number]      ORDERING CLINICIAN:  ELIZABETH ARMENDARIZ      FINDINGS:  1.2 cm suspected right lower lobe pulmonary nodule new from prior  study. This raises concern for possible neoplasm.      COPD changes have worsened in the interval with more flattening of  the diaphragms and more coarse interstitial markings.      No consolidation, effusion, edema, pneumothorax.      Impression: New 1.2 cm pulmonary nodule raising concern for potential neoplasm.  CT of the thorax recommended for further evaluation.      Worsening COPD.      MACRO:  Critical Finding:  See findings. Notification was initiated on  2/12/2025 at 9:45 am by  Yan Almaraz.  (**-YCF-**) Instructions:      Signed by: Yan Almaraz 2/12/2025 9:47 AM  Dictation workstation:   [id number]      Physical Exam  Vitals:    02/16/25 1700   BP: 159/60   Pulse: 75   Resp: 16   Temp: 36.3 °C (97.4 °F)   SpO2: 96%     Constitutional: Pleasant and cooperative. Laying in bed in no acute distress. Conversant.   Skin: Warm and dry; no obvious lesions, rashes, pallor, or jaundice.   Eyes: EOMI. Anicteric sclera.   ENT: Mucous membranes moist; no obvious injury or deformity appreciated.   Head and Neck: Normocephalic, atraumatic. ROM preserved. Trachea midline. No appreciable JVD.   Respiratory: Patient  breathing better respiratory status is improved on 5 L nasal cannula still wheezing  cardiovascula regular heart rate and karime.. No gross murmur, gallop, or rub. Extremities are warm and well-perfused with good capillary refill (< 3 seconds). No chest wall tenderness.   GI: Abdomen soft, tender with minimal palpation diffuse throughout, nondistended. No obvious organomegaly appreciated. Bowel sounds are present.  : No CVA tenderness.   MSK: No gross abnormalities appreciated. No limitations to AROM/PROM appreciated.   Extremities: No cyanosis, edema, or clubbing evident. Neurovascularly intact.   Neuro: A&Ox3. CN 2-12 grossly intact. Able to respond to questions appropriately and clearly. No acute focal neurologic deficits appreciated.  Psych: Depressed.   Relevant Results             Scheduled medications  aspirin, 81 mg, oral, Daily  atorvastatin, 80 mg, oral, Daily  azithromycin, 500 mg, oral, Daily  bisacodyl, 10 mg, oral, Once  cefTRIAXone, 2 g, intravenous, q24h  dexAMETHasone, 4 mg, intravenous, q12h  escitalopram, 20 mg, oral, Daily  tiotropium, 2 puff, inhalation, Daily   And  fluticasone furoate-vilanteroL, 1 puff, inhalation, Daily  guaiFENesin, 1,200 mg, oral, BID  ipratropium-albuteroL, 3 mL, nebulization, 4x daily  magnesium hydroxide, 45 mL, oral, Once  metoprolol succinate XL, 50 mg, oral, Daily  pantoprazole, 40 mg, oral, Daily before breakfast   Or  pantoprazole, 40 mg, intravenous, Daily before breakfast      Continuous medications     PRN medications  PRN medications: acetaminophen, ipratropium-albuteroL, LORazepam, melatonin, morphine, ondansetron, polyethylene glycol    Assessment/Plan        Pneumonia community-acquired  Stage IV severe COPD  Chronic hypoxic and hypercapnic respiratory failure  Multiple new pulmonary nodules on CAT scan  Severe 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 twice daily  Metoprolol XL 50 mg daily  Lexapro 20 mg a day because he wanted  Protonix 40 mg daily  Dose of milk of magnesia and Dulcolax for constipation  As needed Tylenol, DuoNeb, lorazepam, melatonin, morphine, Zofran  Plan discharge with hospice to respite versus ECF  Continue to reevaluate         Malnutrition Diagnosis Status: New  Malnutrition Diagnosis: Severe malnutrition related to chronic disease or condition  Related to: COPD metabolism  As Evidenced by: moderate subcutaneous fat, muscle loss per NFPE, >10% weight loss x 6 months  I agree with the dietitian's malnutrition diagnosis.         Spent 35 minutes in the follow-up management of this patient today    Mingo Avlia MD

## 2025-02-17 PROCEDURE — 94668 MNPJ CHEST WALL SBSQ: CPT

## 2025-02-17 PROCEDURE — 2500000002 HC RX 250 W HCPCS SELF ADMINISTERED DRUGS (ALT 637 FOR MEDICARE OP, ALT 636 FOR OP/ED): Performed by: INTERNAL MEDICINE

## 2025-02-17 PROCEDURE — 99232 SBSQ HOSP IP/OBS MODERATE 35: CPT | Performed by: INTERNAL MEDICINE

## 2025-02-17 PROCEDURE — 94640 AIRWAY INHALATION TREATMENT: CPT

## 2025-02-17 PROCEDURE — 2500000001 HC RX 250 WO HCPCS SELF ADMINISTERED DRUGS (ALT 637 FOR MEDICARE OP): Performed by: INTERNAL MEDICINE

## 2025-02-17 PROCEDURE — 1210000001 HC SEMI-PRIVATE ROOM DAILY

## 2025-02-17 PROCEDURE — 2500000004 HC RX 250 GENERAL PHARMACY W/ HCPCS (ALT 636 FOR OP/ED)

## 2025-02-17 PROCEDURE — 2500000001 HC RX 250 WO HCPCS SELF ADMINISTERED DRUGS (ALT 637 FOR MEDICARE OP)

## 2025-02-17 PROCEDURE — 2500000001 HC RX 250 WO HCPCS SELF ADMINISTERED DRUGS (ALT 637 FOR MEDICARE OP): Performed by: REGISTERED NURSE

## 2025-02-17 PROCEDURE — 2500000002 HC RX 250 W HCPCS SELF ADMINISTERED DRUGS (ALT 637 FOR MEDICARE OP, ALT 636 FOR OP/ED)

## 2025-02-17 PROCEDURE — 2500000004 HC RX 250 GENERAL PHARMACY W/ HCPCS (ALT 636 FOR OP/ED): Performed by: INTERNAL MEDICINE

## 2025-02-17 RX ORDER — OXYCODONE AND ACETAMINOPHEN 5; 325 MG/1; MG/1
1.5 TABLET ORAL EVERY 6 HOURS PRN
Status: DISCONTINUED | OUTPATIENT
Start: 2025-02-17 | End: 2025-02-18 | Stop reason: HOSPADM

## 2025-02-17 RX ORDER — CARISOPRODOL 350 MG/1
350 TABLET ORAL 3 TIMES DAILY PRN
Status: DISCONTINUED | OUTPATIENT
Start: 2025-02-17 | End: 2025-02-18 | Stop reason: HOSPADM

## 2025-02-17 RX ORDER — SENNOSIDES 8.6 MG/1
2 TABLET ORAL 2 TIMES DAILY
Status: DISCONTINUED | OUTPATIENT
Start: 2025-02-17 | End: 2025-02-18

## 2025-02-17 RX ADMIN — LORAZEPAM 0.5 MG: 0.5 TABLET ORAL at 08:36

## 2025-02-17 RX ADMIN — MORPHINE SULFATE 15 MG: 15 TABLET ORAL at 12:14

## 2025-02-17 RX ADMIN — AZITHROMYCIN 500 MG: 250 TABLET, FILM COATED ORAL at 08:26

## 2025-02-17 RX ADMIN — OXYCODONE HYDROCHLORIDE AND ACETAMINOPHEN 1.5 TABLET: 5; 325 TABLET ORAL at 09:19

## 2025-02-17 RX ADMIN — CEFTRIAXONE SODIUM 2 G: 2 INJECTION, SOLUTION INTRAVENOUS at 00:02

## 2025-02-17 RX ADMIN — MORPHINE SULFATE 15 MG: 15 TABLET ORAL at 00:02

## 2025-02-17 RX ADMIN — METOPROLOL SUCCINATE 50 MG: 50 TABLET, EXTENDED RELEASE ORAL at 08:26

## 2025-02-17 RX ADMIN — FLUTICASONE FUROATE AND VILANTEROL TRIFENATATE 1 PUFF: 200; 25 POWDER RESPIRATORY (INHALATION) at 06:17

## 2025-02-17 RX ADMIN — DEXAMETHASONE SODIUM PHOSPHATE 4 MG: 4 INJECTION, SOLUTION INTRAMUSCULAR; INTRAVENOUS at 08:26

## 2025-02-17 RX ADMIN — ASPIRIN 81 MG: 81 TABLET, COATED ORAL at 08:26

## 2025-02-17 RX ADMIN — MORPHINE SULFATE 15 MG: 15 TABLET ORAL at 18:01

## 2025-02-17 RX ADMIN — IPRATROPIUM BROMIDE AND ALBUTEROL SULFATE 3 ML: 2.5; .5 SOLUTION RESPIRATORY (INHALATION) at 15:08

## 2025-02-17 RX ADMIN — GUAIFENESIN 1200 MG: 600 TABLET ORAL at 21:01

## 2025-02-17 RX ADMIN — SENNOSIDES 17.2 MG: 8.6 TABLET ORAL at 21:01

## 2025-02-17 RX ADMIN — IPRATROPIUM BROMIDE AND ALBUTEROL SULFATE 3 ML: 2.5; .5 SOLUTION RESPIRATORY (INHALATION) at 08:19

## 2025-02-17 RX ADMIN — PANTOPRAZOLE SODIUM 40 MG: 40 TABLET, DELAYED RELEASE ORAL at 06:17

## 2025-02-17 RX ADMIN — OXYCODONE HYDROCHLORIDE AND ACETAMINOPHEN 1.5 TABLET: 5; 325 TABLET ORAL at 21:00

## 2025-02-17 RX ADMIN — ESCITALOPRAM OXALATE 20 MG: 10 TABLET ORAL at 08:26

## 2025-02-17 RX ADMIN — IPRATROPIUM BROMIDE AND ALBUTEROL SULFATE 3 ML: 2.5; .5 SOLUTION RESPIRATORY (INHALATION) at 11:47

## 2025-02-17 RX ADMIN — GUAIFENESIN 1200 MG: 600 TABLET ORAL at 08:26

## 2025-02-17 RX ADMIN — OXYCODONE HYDROCHLORIDE AND ACETAMINOPHEN 1.5 TABLET: 5; 325 TABLET ORAL at 14:58

## 2025-02-17 RX ADMIN — LORAZEPAM 0.5 MG: 0.5 TABLET ORAL at 18:02

## 2025-02-17 RX ADMIN — MORPHINE SULFATE 15 MG: 15 TABLET ORAL at 06:17

## 2025-02-17 RX ADMIN — IPRATROPIUM BROMIDE AND ALBUTEROL SULFATE 3 ML: 2.5; .5 SOLUTION RESPIRATORY (INHALATION) at 20:55

## 2025-02-17 RX ADMIN — ATORVASTATIN CALCIUM 80 MG: 40 TABLET, FILM COATED ORAL at 06:17

## 2025-02-17 RX ADMIN — CEFTRIAXONE SODIUM 2 G: 2 INJECTION, SOLUTION INTRAVENOUS at 23:29

## 2025-02-17 RX ADMIN — TIOTROPIUM BROMIDE INHALATION SPRAY 2 PUFF: 3.12 SPRAY, METERED RESPIRATORY (INHALATION) at 06:17

## 2025-02-17 RX ADMIN — DEXAMETHASONE SODIUM PHOSPHATE 4 MG: 4 INJECTION, SOLUTION INTRAMUSCULAR; INTRAVENOUS at 21:01

## 2025-02-17 ASSESSMENT — PAIN SCALES - GENERAL
PAINLEVEL_OUTOF10: 2
PAINLEVEL_OUTOF10: 8
PAINLEVEL_OUTOF10: 9
PAINLEVEL_OUTOF10: 9
PAINLEVEL_OUTOF10: 0 - NO PAIN
PAINLEVEL_OUTOF10: 4
PAINLEVEL_OUTOF10: 4
PAINLEVEL_OUTOF10: 8
PAINLEVEL_OUTOF10: 9
PAINLEVEL_OUTOF10: 7
PAINLEVEL_OUTOF10: 6
PAINLEVEL_OUTOF10: 6

## 2025-02-17 ASSESSMENT — COGNITIVE AND FUNCTIONAL STATUS - GENERAL
CLIMB 3 TO 5 STEPS WITH RAILING: A LITTLE
MOBILITY SCORE: 20
DRESSING REGULAR LOWER BODY CLOTHING: A LITTLE
WALKING IN HOSPITAL ROOM: A LITTLE
STANDING UP FROM CHAIR USING ARMS: A LITTLE
DAILY ACTIVITIY SCORE: 22
MOVING TO AND FROM BED TO CHAIR: A LITTLE
TOILETING: A LITTLE

## 2025-02-17 ASSESSMENT — PAIN DESCRIPTION - LOCATION
LOCATION: BACK

## 2025-02-17 ASSESSMENT — ENCOUNTER SYMPTOMS
COUGH: 1
WEAKNESS: 1
FATIGUE: 1
BACK PAIN: 1
CONFUSION: 0

## 2025-02-17 ASSESSMENT — PAIN - FUNCTIONAL ASSESSMENT
PAIN_FUNCTIONAL_ASSESSMENT: 0-10

## 2025-02-17 ASSESSMENT — PAIN DESCRIPTION - DESCRIPTORS: DESCRIPTORS: ACHING

## 2025-02-17 ASSESSMENT — PAIN DESCRIPTION - ORIENTATION
ORIENTATION: LOWER

## 2025-02-17 NOTE — NURSING NOTE
Taking over care of patient at this time from Amina ARAYA    Agree with her previous assessment.  Patient resting in bed at this time, no concerns voiced.

## 2025-02-17 NOTE — PROGRESS NOTES
SHERRI notified pt is now wanting to return home with hospice as family members are no longer sick. SHERRI spoke with RN Gabriela from [facility], hospice to reach out to family and arrange DME delivery, aiming for discharge in the AM. SHERRI to continue following.    Ulises Whiting, MSW, LSW ([id number])   Care Transitions

## 2025-02-17 NOTE — CARE PLAN
The patient's goals for the shift include      The clinical goals for the shift include pt will have adequate pain control throughout shift    Over the shift, the patient did not make progress toward the following goals. Barriers to progression include . Recommendations to address these barriers include .     [FreeTextEntry1] : annual [de-identified] : 72 yo M with BPH, HLD presents for annual check up. He recent had skiing accident which resulted in a dislocated shoulder and 3 torn tendons Jan 2020. He was evaluated by ortho who recommended surgery but pt reluctant and wants to try PT. He is still doing PT but has improved remarkably. He has much improved ROM and screnth. Pain still there but more of soreness, mostly when he sleeps. He is traveling to [place] in next few weeks on [place]. \par \par He is generally pretty active, exercises regularly, does yoga. He eats pretty healthy but can do better. He currently takes pravastatin 10mg and tamsulosin 5mg daily. He follows up with urologist. Had PSA done that was normal. He does need pravastatin refills but he has enough of tamsulosin. \par \par colon cancer screening-- colonoscopy 10/19, normal but with some diverticulosis. Recommended next colonoscopy in 5 years.\par \par He is , has girlfriend. Currently not sexually active. Declines STD screening.\par Pt also declines all vaccines.

## 2025-02-17 NOTE — PROGRESS NOTES
Ino Pierce is a 60 y.o. male on day 4 of admission presenting with Pneumonia of both lungs due to infectious organism, unspecified part of lung.      Subjective   Ino Pierce is a 60 y.o. male with PMHx s/f stage IV severe COPD, chronic hypoxic hypercapnic respiratory failure on 5 L nasal cannula, cigarette dependence, CAD, HTN, HLD, HFrEF, seizure in 2016, DAV presenting with worsening shortness of breath.  Has associated headache, productive cough and generalized malaise for past few days.  He states that he is following with palliative care at [facility] for his COPD but was kicked out of the program because he could not make it to the appointments.  He notes he has been having difficulty this year and is emotional exhausted as there were four family members and two pets passed away, all in this year. He got kicked out of his house and is currently living with his nephew. He has no active suicide ideation nor plan of suicide. He is not taking any medications, has been off of his inhalers for a while. Does not know what medications he was on.  Denies lightheadedness, fever chills nausea vomiting, chest pain, leg swelling.  He states almost everyone is sick at his place right now.      ED Course (Summary - please note all labs, imaging studies, and interventions noted below have been personally reviewed and/or interpreted on day of admission):   Vitals on presentation: 98.3 F, 105 bpm, 20 RR, 144/91, 90% on 5 L nasal cannula  Labs: CMP, CBC, VBG largely unremarkable  Viral studies negative  EKG: Sinus tachycardia at 101 bpm, no acute STEMI  Imaging: CXR-new 1.2 cm pulmonary nodule concerning for potential neoplasm  CT chest AP-mild new mediastinal adenopathy and bilateral hilar adenopathy.  Mild secretion in trachea and main bronchi, bronchial wall thickening in both lower lobes consistent with acute bronchitis.  Areas of reticular nodule and infiltrate density in lower lobes consistent with Imodium.  Scattered  new area of tiny nodule and reticular nodular density in right upper and middle lobe consistent with acute bronchiolitis.  Underlying emphysema.  Aortoiliac calcifications.  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  Small right lobe liver cyst.  Previous appendectomy.  Interventions: Zithromax, Augmentin, morphine 4 mg x 2, prednisone 60 mg, admission for further management  2/13: Patient was seen and examined.  Saturating well on 6 5 L nasal cannula oxygen which is his baseline.  Continues to complain of shortness of breath.  Was also complaining of pain.  Urine Legionella and strep antigen are negative.  Will continue current antibiotics breathing treatment and steroids.  Palliative care consulted.  2/14: Patient was seen and examined.  Saturating well on 5 L nasal cannula oxygen.  Patient is admitting to home with home hospice.  Palliative care and hospice to see later today.  Psychiatry consulted will get to see.  Patient is medically stable for inpatient psychiatric evaluation/placement.  2/15: Patient feeling better seen by hospice working on discharge planning Home with home hospice.  2/16: Trying to work out with case management social worker discharge with hospice.  Family states he cannot come home at this point because they are so sick at home.  Did talk to  about respite so that we do not have to wait as long for authorization.  Clinically stable for discharge with hospice but health is very tenuous..  Will add IV steroids.  Will finish up with the oral taper on discharge.  2/17: Patient reports shortness of breath upon waking this am but states it has improved after receiving morning meds. Currently on 5L NC. Reports increased lower back pain and requesting more pain medication. Clinically stable for discharge with hospice. Patient is currently unable to return home due to multiple sick family members in home. SW following case.   Addendum: Will add Soma and Norco for back pain.  Working on  discharge planning.  Objective     Last Recorded Vitals  /73 (BP Location: Left arm, Patient Position: Lying)   Pulse 69   Temp 36.4 °C (97.6 °F) (Temporal)   Resp 16   Wt 52.3 kg (115 lb 4.8 oz)   SpO2 98%   Intake/Output last 3 Shifts:    Intake/Output Summary (Last 24 hours) at 2/17/2025 1153  Last data filed at 2/17/2025 0900  Gross per 24 hour   Intake 520 ml   Output 1700 ml   Net -1180 ml       Admission Weight  Weight: 57.6 kg (127 lb) (02/12/25 0832)    Daily Weight  02/13/25 : 52.3 kg (115 lb 4.8 oz)    Image Results  CT chest abdomen pelvis w IV contrast  Narrative: Interpreted By:  Tyshawn Barahona,   STUDY:  CT CHEST ABDOMEN PELVIS W IV CONTRAST;  2/12/2025 10:19 am      INDICATION:  61 y/o   M with  Signs/Symptoms:left abd pain, sob.          LIMITATIONS:  None..      ACCESSION NUMBER(S):  [id number]      ORDERING CLINICIAN:  ELIZABETH ARMENDARIZ      TECHNIQUE:  After the administration of IV nonionic contrast, images were  obtained from the thoracic inlet down through the symphysis pubis.  Sagittal and coronal reconstruction images were generated.  Mediastinal, lung, bone, and liver windows were reviewed. Omnipaque  350   75 ML      COMPARISON:  CT chest with contrast dated 03/18/2022.      FINDINGS:  CHEST FINDINGS:      CHEST WALL/BASE OF THE NECK:  The chest wall was grossly intact.  No thyromegaly or thyroid mass.      LUNGS/ PLEURA/ AND TRACHEA:  No pleural effusion. No pneumothorax.  Mild secretions along the posterior wall of the distal trachea..  There are very mild secretions along the posterior walls of each main  bronchus. There is bilateral bronchial wall thickening, most  pronounced in the right middle lobe and left lingula and the lower  lobes. There are some secretions in several segmental and  subsegmental bronchi in the lower lobes, right greater than left.  There are infiltrative densities and also reticulonodular densities  all all posteriorly in both lower lobes consistent with  pneumonia,  right greater than left. There are emphysematous changes in the  lungs. There are several scattered small nodules peripherally in the  lateral and posterior right upper lobe, for example on axial image  98. These were not present previously. There are multiple tiny  nodules laterally in the inferior aspect of the right upper lobe  centered on axial image 184, not evident previously. There are also  multiple tiny peripheral nodules laterally in the right middle lobe,  for example on image 223, not present previously. There are multiple  new tiny nodules and reticulonodular densities in the superior  segment of the right lower lobe. There are new tiny nodules and  reticulonodular densities in the left lingula.      MEDIASTINUM/ARMIDA:  Development of mild mediastinal adenopathy with lymph nodes measuring  10 mm short axis or less. Very mild bilateral hilar adenopathy. No  axillary adenopathy. No cardiomegaly.  No pericardial effusion.  There was no aneurysmal dilatation or dissection of the thoracic  aorta.      BONES:  No destructive lytic or blastic bone lesion.          ABDOMEN/PELVIS FINDINGS:      LIVER:  No hepatomegaly.  Liver density was  within the limits of normal.  There is a 6 mm cyst in segment 6 of the liver on image 122. Normal  portal vein enhancement..      GALLBLADDER:  No calcified stone, gallbladder wall thickening, or adjacent edema.      BILE DUCTS:  No intrahepatic biliary ductal dilatation.  Common bile duct was within the limits of normal.      SPLEEN:  No splenomegaly.  No splenic  mass.      PANCREAS:  No pancreatic mass or inflammation, or ductal dilatation.      KIDNEYS/ADRENALS:  No adrenal mass or enlargement.  No calcified stone, hydronephrosis, mass, or perinephric edema in  either kidney. No ureteral stone or dilatation.      BLADDER/PELVIS:  Urinary bladder was grossly intact.  No prostate enlargement.      GREAT VESSELS/RETROPERITONEUM:  There is severe stenosis of the  proximal to mid SMA caused by mural  calcification and posterior wall mural thrombus. Moderate aortoiliac  calcifications. Otherwise, abdominal aorta and IVC were grossly  intact. No suspicious retroperitoneal adenopathy.  No suspicious mesenteric adenopathy.  No suspicious pelvic or inguinal adenopathy.      PERITONEUM:  No ascites.  No pneumoperitoneum.  No peritoneal or mesenteric mass or inflammation.      BOWEL:  The stomach was  grossly intact.  There was no small bowel dilatation or small bowel wall thickening.  No small-bowel obstruction. Mild scattered retained colonic stool and  gas. There was no colonic wall thickening or large bowel obstruction.  No edema adjacent to the colon. The cecal appendix was surgically  absent..      BONES:  No destructive lytic or blastic bone lesion. Bilateral L4 pars  defects. Grade 1 anterolisthesis of L4 over L5. Mild-to-moderate disc  space narrowing with endplate spurring at that level. Inter facet  hypertrophic arthritic changes at L5-S1 with mild disc space  narrowing at that level. There is mild endplate osteophytosis at L1-2  and L2-3. Slight disc space narrowing at L1-2. Incidental note of  partial sacralization of the left L5 transverse process. Mild  sclerotic arthritic changes in both SI joints.      ABDOMINAL WALL:  Unremarkable.      Impression: CHEST:  Mild new mediastinal adenopathy and very mild bilateral hilar  adenopathy, most likely infectious/inflammatory.      Mild secretions in the trachea and main bronchi as described, in  addition to secretions evident in several segmental/subsegmental  bronchi in the lower lobes. In addition, there is bronchial wall  thickening in both lower lobes and to a lesser extent the right  middle lobe and left lingula consistent with acute bronchitis.  Finally, there are areas of reticulonodular and more confluent  infiltrative density in the lower lobes as described, right greater  than left, consistent with pneumonia. There  are also scattered new  areas of tiny nodules and reticulonodular densities in the right  upper lobe and right middle lobe, superior segment of the right lower  lobe, and in the left lingula consistent with acute bronchiolitis.      Underlying emphysema.          ABDOMEN/PELVIS:  Arthritic changes in the lumbosacral spine as described. No CT  evidence of fracture of the lumbar spine or pelvis in this exam.      Aortoiliac calcifications. Severe stenosis of the proximal to mid SMA  as described. Please see above for details.      Small right lobe liver cyst.      Previous appendectomy.      MACRO:  None      Signed by: Tyshawn Barahona 2/12/2025 10:46 AM  Dictation workstation:   [id number]  Electrocardiogram, 12-lead  Sinus tachycardia  Biatrial enlargement  ST elevation, consider inferior injury    See ED provider note for full interpretation and clinical correlation  Confirmed by Sandy Valles ([id number]) on 2/12/2025 9:56:47 AM  XR chest 1 view  Narrative: Interpreted By:  Yan Almaraz,   STUDY:  XR CHEST 1 VIEW      INDICATION:  Signs/Symptoms:sob.      COMPARISON:  March 18, 2022      ACCESSION NUMBER(S):  [id number]      ORDERING CLINICIAN:  ELIZABETH ARMENDARIZ      FINDINGS:  1.2 cm suspected right lower lobe pulmonary nodule new from prior  study. This raises concern for possible neoplasm.      COPD changes have worsened in the interval with more flattening of  the diaphragms and more coarse interstitial markings.      No consolidation, effusion, edema, pneumothorax.      Impression: New 1.2 cm pulmonary nodule raising concern for potential neoplasm.  CT of the thorax recommended for further evaluation.      Worsening COPD.      MACRO:  Critical Finding:  See findings. Notification was initiated on  2/12/2025 at 9:45 am by  Yan Almaraz.  (**-YCF-**) Instructions:      Signed by: Yan Almaraz 2/12/2025 9:47 AM  Dictation workstation:   [id number]      Physical Exam  Vitals:    02/17/25 1147   BP:    Pulse:    Resp:     Temp:    SpO2: 98%     Constitutional: Pleasant and cooperative. Laying in bed in no acute distress. Conversant.   Skin: Warm and dry; no obvious lesions, rashes, pallor, or jaundice.   Eyes: EOMI. Anicteric sclera.   ENT: Mucous membranes moist; no obvious injury or deformity appreciated.   Head and Neck: Normocephalic, atraumatic. ROM preserved. Trachea midline. No appreciable JVD.   Respiratory: +Wheezes, currently on 5L NC   Cardiovascular: regular heart rate and karime. No gross murmur, gallop, or rub. Extremities are warm and well-perfused with good capillary refill (< 3 seconds). No chest wall tenderness.   GI: Abdomen soft, tender with minimal palpation diffuse throughout, nondistended. No obvious organomegaly appreciated. Bowel sounds are present.  MSK: No gross abnormalities appreciated.  Decreased range of motion of the trunk because of low back pain.  Extremities: No cyanosis, edema, or clubbing evident. Neurovascularly intact.   Neuro: A&Ox3. CN 2-12 grossly intact. Able to respond to questions appropriately and clearly. No acute focal neurologic deficits appreciated.  Psych: Appropriate mood and affect.   Relevant Results             Scheduled medications  aspirin, 81 mg, oral, Daily  atorvastatin, 80 mg, oral, Daily  azithromycin, 500 mg, oral, Daily  bisacodyl, 10 mg, oral, Once  cefTRIAXone, 2 g, intravenous, q24h  dexAMETHasone, 4 mg, intravenous, q12h  escitalopram, 20 mg, oral, Daily  tiotropium, 2 puff, inhalation, Daily   And  fluticasone furoate-vilanteroL, 1 puff, inhalation, Daily  guaiFENesin, 1,200 mg, oral, BID  ipratropium-albuteroL, 3 mL, nebulization, 4x daily  magnesium hydroxide, 45 mL, oral, Once  metoprolol succinate XL, 50 mg, oral, Daily  pantoprazole, 40 mg, oral, Daily before breakfast   Or  pantoprazole, 40 mg, intravenous, Daily before breakfast  sennosides, 2 tablet, oral, BID      Continuous medications     PRN medications  PRN medications: acetaminophen, carisoprodol,  ipratropium-albuteroL, LORazepam, melatonin, morphine, ondansetron, oxyCODONE-acetaminophen, polyethylene glycol    Assessment/Plan        Pneumonia community-acquired  Stage IV severe COPD  Chronic hypoxic and hypercapnic respiratory failure  Multiple new pulmonary nodules on CAT scan  Severe stenosis of proximal to mid SMA on CT  Anxiety depression  Passive suicidal thoughts  Coronary disease, hypertension, hyperlipidemia heart failure with reduced ejection fraction  Tobacco dependence  Palliative treatment with poor prognosis      Decadron 4 mg every 12 hours IV  Azithromycin 500 mg daily  Rocephin 2 g IV daily   Spiriva 2 puffs daily  Breo Ellipta 1 puff daily  DuoNeb aerosols 4 times a day  Mucinex 1200 p.o. twice daily  Metoprolol XL 50 mg daily  Lexapro 20 mg a day because he wanted  Protonix 40 mg daily  As needed Norco and Soma for back pain.  Dose of milk of magnesia and Dulcolax for constipation  As needed Tylenol, DuoNeb, lorazepam, melatonin, morphine, Zofran  Plan discharge with hospice to respite versus ECF  Continue to reevaluate

## 2025-02-17 NOTE — PROGRESS NOTES
Physical Therapy                 Therapy Communication Note    Patient Name: Ino Pierce  MRN: [id number]  Department: [address]  Room: [facility]  Today's Date: 2/17/2025     Discipline: Physical Therapy    PT Missed Visit: Yes     Missed Visit Reason: Missed Visit Reason:  (pt ref while sitting EOB stating he has already been up and moving this morning)    Missed Time: Attempt    Comment:

## 2025-02-17 NOTE — PROGRESS NOTES
Palliative Care Follow-Up Progress Note    Ino Pierce is a 60 y.o. male on day 4 of admission presenting with weakness, fall, shortness of breath, cough and passive SI. He was found to have bilateral pneumonia, multiple new pulmonary nodules noted on CT concerning for neoplasm, and severe stenosis of proximal to mid SMA     Past medical history includes stage 4 COPD, chronic hypoxic/hypercapnic respiratory on home oxygen x 7 years, seizure, chronic HFrEF, RCC s/p right partial nephrectomy 07/2019, MI, chronic pain and anxiety (chronic opioid and benzo per [facility])     Patient has lived with his nephew and his family for the last 3 months. He has had 2 falls in the last couple of months, independent in ADL's, sponge bathes, uses a walker at times, independent in finances and medication management. Family do household chores including cooking, cleaning, laundry and also transport patient where he needs to go as he has not driven in the last 1.5 years. This is his first hospitalization since December 2023 2/17/2025: Patient seen and examined, no visitors at the bedside. Patient hoping he can be discharged today and reports he does not need a nursing home as his family members are no longer sick. He otherwise denies complaints.     Review of Systems   Constitutional:  Positive for fatigue.   Respiratory:  Positive for cough.    Musculoskeletal:  Positive for back pain.   Neurological:  Positive for weakness.   Psychiatric/Behavioral:  Negative for confusion.        Physical Exam  Constitutional:       General: He is not in acute distress.  HENT:      Head: Normocephalic.      Nose: Nose normal.      Mouth/Throat:      Mouth: Mucous membranes are moist.   Eyes:      General: No scleral icterus.  Cardiovascular:      Rate and Rhythm: Normal rate.   Pulmonary:      Effort: Pulmonary effort is normal.      Comments: Oxygen via NC  Musculoskeletal:      Cervical back: Neck supple.   Neurological:      Mental Status: He  is alert and oriented to person, place, and time.       Plan     Interventions for symptom management:   Bowels: no documented BM in 5 days and on PRN MSIR. Refused dulcolax and MOM yesterday. Initiated scheduled senna   Expectations of treatment/Goals of care: home with hospice   Advanced directives/Advanced care planning: requested copies of HCPOA and living will    Health care power of  or Identified surrogate medical decision maker based on [place] hierarchy: ex-girlfriend Jacquelyn is reported HCPOA, patient states he does not want to update documents and comfortable with her being POA. Without document, patient's 4 adult children would be his medical decision makers if patient lost capacity to make his own medical decisions   Code status: DNR/DNI [place] DNR: completed [place] DNR CCA 2/14 and instructed patient to hang on the refrigerator once home, copy placed on hospital chart. Will likely transition to [facility] once enrolled into hospice   Outpatient services: [facility] placed 2/14 and has already met with patient     Collaborated with: SHERRI    I spent 20 minutes in the professional and overall care of this patient.    Hussain Bailey, APRN-CNP

## 2025-02-17 NOTE — CARE PLAN
The patient's goals for the shift include      The clinical goals for the shift include pt will have adequate pain control throughout shift      Problem: Safety - Adult  Goal: Free from fall injury  Outcome: Progressing     Problem: Discharge Planning  Goal: Discharge to home or other facility with appropriate resources  Outcome: Progressing     Problem: Chronic Conditions and Co-morbidities  Goal: Patient's chronic conditions and co-morbidity symptoms are monitored and maintained or improved  Outcome: Progressing     Problem: Nutrition  Goal: Nutrient intake appropriate for maintaining nutritional needs  Outcome: Progressing     Problem: Skin  Goal: Decreased wound size/increased tissue granulation at next dressing change  Outcome: Progressing  Flowsheets (Taken 2/16/2025 1957)  Decreased wound size/increased tissue granulation at next dressing change: Promote sleep for wound healing  Goal: Participates in plan/prevention/treatment measures  Outcome: Progressing  Flowsheets (Taken 2/16/2025 1957)  Participates in plan/prevention/treatment measures:   Discuss with provider PT/OT consult   Elevate heels   Increase activity/out of bed for meals  Goal: Prevent/manage excess moisture  Outcome: Progressing  Flowsheets (Taken 2/16/2025 1957)  Prevent/manage excess moisture:   Cleanse incontinence/protect with barrier cream   Moisturize dry skin  Goal: Prevent/minimize sheer/friction injuries  Outcome: Progressing  Flowsheets (Taken 2/16/2025 1957)  Prevent/minimize sheer/friction injuries:   Increase activity/out of bed for meals   Use pull sheet  Goal: Promote/optimize nutrition  Outcome: Progressing  Flowsheets (Taken 2/16/2025 1957)  Promote/optimize nutrition: Monitor/record intake including meals  Goal: Promote skin healing  Outcome: Progressing  Flowsheets (Taken 2/16/2025 1957)  Promote skin healing:   Assess skin/pad under line(s)/device(s)   Rotate device position/do not position patient on device   Protective  dressings over bony prominences     Problem: Fall/Injury  Goal: Not fall by end of shift  Outcome: Progressing  Goal: Be free from injury by end of the shift  Outcome: Progressing  Goal: Verbalize understanding of personal risk factors for fall in the hospital  Outcome: Progressing  Goal: Verbalize understanding of risk factor reduction measures to prevent injury from fall in the home  Outcome: Progressing  Goal: Use assistive devices by end of the shift  Outcome: Progressing  Goal: Pace activities to prevent fatigue by end of the shift  Outcome: Progressing     Problem: Pain  Goal: Takes deep breaths with improved pain control throughout the shift  Outcome: Progressing  Goal: Turns in bed with improved pain control throughout the shift  Outcome: Progressing  Goal: Walks with improved pain control throughout the shift  Outcome: Progressing  Goal: Performs ADL's with improved pain control throughout shift  Outcome: Progressing  Goal: Participates in PT with improved pain control throughout the shift  Outcome: Progressing  Goal: Free from opioid side effects throughout the shift  Outcome: Progressing  Goal: Free from acute confusion related to pain meds throughout the shift  Outcome: Progressing     Problem: Risk for Suicide  Goal: Accepts medications as prescribed/needed this shift  Outcome: Progressing  Goal: Identifies supports this shift  Outcome: Progressing  Goal: Makes needs known through verbalization or behaviors this shift  Outcome: Progressing  Goal: No self harm this shift  Outcome: Progressing  Goal: Read Safety Guidelines this shift  Outcome: Progressing  Goal: Complete Mental Health Safety Plan (psychiatry only) this shift  Outcome: Progressing

## 2025-02-18 ENCOUNTER — PHARMACY VISIT (OUTPATIENT)
Dept: PHARMACY | Facility: CLINIC | Age: 60
End: 2025-02-18
Payer: COMMERCIAL

## 2025-02-18 VITALS
HEIGHT: 66 IN | RESPIRATION RATE: 18 BRPM | OXYGEN SATURATION: 100 % | SYSTOLIC BLOOD PRESSURE: 128 MMHG | HEART RATE: 73 BPM | WEIGHT: 115.3 LBS | TEMPERATURE: 98.9 F | BODY MASS INDEX: 18.53 KG/M2 | DIASTOLIC BLOOD PRESSURE: 72 MMHG

## 2025-02-18 PROCEDURE — 2500000001 HC RX 250 WO HCPCS SELF ADMINISTERED DRUGS (ALT 637 FOR MEDICARE OP): Performed by: REGISTERED NURSE

## 2025-02-18 PROCEDURE — 2500000002 HC RX 250 W HCPCS SELF ADMINISTERED DRUGS (ALT 637 FOR MEDICARE OP, ALT 636 FOR OP/ED): Performed by: INTERNAL MEDICINE

## 2025-02-18 PROCEDURE — 2500000002 HC RX 250 W HCPCS SELF ADMINISTERED DRUGS (ALT 637 FOR MEDICARE OP, ALT 636 FOR OP/ED)

## 2025-02-18 PROCEDURE — 2500000001 HC RX 250 WO HCPCS SELF ADMINISTERED DRUGS (ALT 637 FOR MEDICARE OP): Performed by: INTERNAL MEDICINE

## 2025-02-18 PROCEDURE — RXMED WILLOW AMBULATORY MEDICATION CHARGE

## 2025-02-18 PROCEDURE — 99239 HOSP IP/OBS DSCHRG MGMT >30: CPT | Performed by: INTERNAL MEDICINE

## 2025-02-18 PROCEDURE — 94669 MECHANICAL CHEST WALL OSCILL: CPT

## 2025-02-18 PROCEDURE — 94640 AIRWAY INHALATION TREATMENT: CPT

## 2025-02-18 PROCEDURE — 2500000001 HC RX 250 WO HCPCS SELF ADMINISTERED DRUGS (ALT 637 FOR MEDICARE OP)

## 2025-02-18 PROCEDURE — 94668 MNPJ CHEST WALL SBSQ: CPT

## 2025-02-18 PROCEDURE — 2500000004 HC RX 250 GENERAL PHARMACY W/ HCPCS (ALT 636 FOR OP/ED): Performed by: INTERNAL MEDICINE

## 2025-02-18 RX ORDER — CEFDINIR 300 MG/1
300 CAPSULE ORAL 2 TIMES DAILY
Qty: 10 CAPSULE | Refills: 0 | Status: SHIPPED | OUTPATIENT
Start: 2025-02-18 | End: 2025-02-23

## 2025-02-18 RX ORDER — OXYCODONE AND ACETAMINOPHEN 5; 325 MG/1; MG/1
1.5 TABLET ORAL EVERY 6 HOURS PRN
Qty: 20 TABLET | Refills: 0 | Status: SHIPPED | OUTPATIENT
Start: 2025-02-18

## 2025-02-18 RX ORDER — CARISOPRODOL 350 MG/1
350 TABLET ORAL 3 TIMES DAILY PRN
Qty: 30 TABLET | Refills: 0 | Status: SHIPPED | OUTPATIENT
Start: 2025-02-18

## 2025-02-18 RX ORDER — DOXYCYCLINE 100 MG/1
100 CAPSULE ORAL 2 TIMES DAILY
Qty: 10 CAPSULE | Refills: 0 | Status: SHIPPED | OUTPATIENT
Start: 2025-02-18 | End: 2025-02-23

## 2025-02-18 RX ORDER — PREDNISONE 10 MG/1
TABLET ORAL
Qty: 20 TABLET | Refills: 2 | Status: SHIPPED | OUTPATIENT
Start: 2025-02-18 | End: 2025-02-26

## 2025-02-18 RX ADMIN — LORAZEPAM 0.5 MG: 0.5 TABLET ORAL at 20:09

## 2025-02-18 RX ADMIN — OXYCODONE HYDROCHLORIDE AND ACETAMINOPHEN 1.5 TABLET: 5; 325 TABLET ORAL at 11:22

## 2025-02-18 RX ADMIN — CARISOPRODOL 350 MG: 350 TABLET ORAL at 11:24

## 2025-02-18 RX ADMIN — LORAZEPAM 0.5 MG: 0.5 TABLET ORAL at 08:44

## 2025-02-18 RX ADMIN — DEXAMETHASONE SODIUM PHOSPHATE 4 MG: 4 INJECTION, SOLUTION INTRAMUSCULAR; INTRAVENOUS at 08:44

## 2025-02-18 RX ADMIN — ASPIRIN 81 MG: 81 TABLET, COATED ORAL at 08:44

## 2025-02-18 RX ADMIN — MORPHINE SULFATE 15 MG: 15 TABLET ORAL at 14:49

## 2025-02-18 RX ADMIN — GUAIFENESIN 1200 MG: 600 TABLET ORAL at 20:09

## 2025-02-18 RX ADMIN — AZITHROMYCIN 500 MG: 250 TABLET, FILM COATED ORAL at 08:44

## 2025-02-18 RX ADMIN — FLUTICASONE FUROATE AND VILANTEROL TRIFENATATE 1 PUFF: 200; 25 POWDER RESPIRATORY (INHALATION) at 07:02

## 2025-02-18 RX ADMIN — ATORVASTATIN CALCIUM 80 MG: 40 TABLET, FILM COATED ORAL at 07:01

## 2025-02-18 RX ADMIN — IPRATROPIUM BROMIDE AND ALBUTEROL SULFATE 3 ML: 2.5; .5 SOLUTION RESPIRATORY (INHALATION) at 08:46

## 2025-02-18 RX ADMIN — ESCITALOPRAM OXALATE 20 MG: 10 TABLET ORAL at 08:44

## 2025-02-18 RX ADMIN — PANTOPRAZOLE SODIUM 40 MG: 40 TABLET, DELAYED RELEASE ORAL at 07:01

## 2025-02-18 RX ADMIN — MORPHINE SULFATE 15 MG: 15 TABLET ORAL at 00:04

## 2025-02-18 RX ADMIN — CARISOPRODOL 350 MG: 350 TABLET ORAL at 00:04

## 2025-02-18 RX ADMIN — TIOTROPIUM BROMIDE INHALATION SPRAY 2 PUFF: 3.12 SPRAY, METERED RESPIRATORY (INHALATION) at 07:02

## 2025-02-18 RX ADMIN — METOPROLOL SUCCINATE 50 MG: 50 TABLET, EXTENDED RELEASE ORAL at 08:44

## 2025-02-18 RX ADMIN — GUAIFENESIN 1200 MG: 600 TABLET ORAL at 08:45

## 2025-02-18 RX ADMIN — OXYCODONE HYDROCHLORIDE AND ACETAMINOPHEN 1.5 TABLET: 5; 325 TABLET ORAL at 20:08

## 2025-02-18 RX ADMIN — OXYCODONE HYDROCHLORIDE AND ACETAMINOPHEN 1.5 TABLET: 5; 325 TABLET ORAL at 05:00

## 2025-02-18 RX ADMIN — IPRATROPIUM BROMIDE AND ALBUTEROL SULFATE 3 ML: 2.5; .5 SOLUTION RESPIRATORY (INHALATION) at 11:40

## 2025-02-18 RX ADMIN — MORPHINE SULFATE 15 MG: 15 TABLET ORAL at 08:44

## 2025-02-18 RX ADMIN — IPRATROPIUM BROMIDE AND ALBUTEROL SULFATE 3 ML: 2.5; .5 SOLUTION RESPIRATORY (INHALATION) at 15:45

## 2025-02-18 ASSESSMENT — ENCOUNTER SYMPTOMS
CONFUSION: 0
BACK PAIN: 1
CONSTIPATION: 0
WEAKNESS: 1
SHORTNESS OF BREATH: 1

## 2025-02-18 ASSESSMENT — PAIN DESCRIPTION - LOCATION: LOCATION: BACK

## 2025-02-18 ASSESSMENT — PAIN SCALES - GENERAL
PAINLEVEL_OUTOF10: 8
PAINLEVEL_OUTOF10: 7
PAINLEVEL_OUTOF10: 9
PAINLEVEL_OUTOF10: 9
PAINLEVEL_OUTOF10: 8

## 2025-02-18 ASSESSMENT — PAIN - FUNCTIONAL ASSESSMENT
PAIN_FUNCTIONAL_ASSESSMENT: 0-10
PAIN_FUNCTIONAL_ASSESSMENT: 0-10

## 2025-02-18 ASSESSMENT — PAIN DESCRIPTION - ORIENTATION: ORIENTATION: LOWER

## 2025-02-18 ASSESSMENT — PAIN DESCRIPTION - DESCRIPTORS: DESCRIPTORS: ACHING

## 2025-02-18 NOTE — CARE PLAN
Problem: Safety - Adult  Goal: Free from fall injury  Outcome: Progressing     Problem: Discharge Planning  Goal: Discharge to home or other facility with appropriate resources  Outcome: Progressing     Problem: Chronic Conditions and Co-morbidities  Goal: Patient's chronic conditions and co-morbidity symptoms are monitored and maintained or improved  Outcome: Progressing     Problem: Nutrition  Goal: Nutrient intake appropriate for maintaining nutritional needs  Outcome: Progressing     Problem: Skin  Goal: Decreased wound size/increased tissue granulation at next dressing change  Outcome: Progressing  Goal: Participates in plan/prevention/treatment measures  Outcome: Progressing  Goal: Prevent/manage excess moisture  Outcome: Progressing  Goal: Prevent/minimize sheer/friction injuries  Outcome: Progressing  Goal: Promote/optimize nutrition  Outcome: Progressing  Goal: Promote skin healing  Outcome: Progressing     Problem: Fall/Injury  Goal: Not fall by end of shift  Outcome: Progressing  Goal: Be free from injury by end of the shift  Outcome: Progressing  Goal: Verbalize understanding of personal risk factors for fall in the hospital  Outcome: Progressing  Goal: Verbalize understanding of risk factor reduction measures to prevent injury from fall in the home  Outcome: Progressing  Goal: Use assistive devices by end of the shift  Outcome: Progressing  Goal: Pace activities to prevent fatigue by end of the shift  Outcome: Progressing     Problem: Pain  Goal: Takes deep breaths with improved pain control throughout the shift  Outcome: Progressing  Goal: Turns in bed with improved pain control throughout the shift  Outcome: Progressing  Goal: Walks with improved pain control throughout the shift  Outcome: Progressing  Goal: Performs ADL's with improved pain control throughout shift  Outcome: Progressing  Goal: Participates in PT with improved pain control throughout the shift  Outcome: Progressing  Goal: Free from  opioid side effects throughout the shift  Outcome: Progressing  Goal: Free from acute confusion related to pain meds throughout the shift  Outcome: Progressing     Problem: Risk for Suicide  Goal: Accepts medications as prescribed/needed this shift  Outcome: Progressing  Goal: Identifies supports this shift  Outcome: Progressing  Goal: Makes needs known through verbalization or behaviors this shift  Outcome: Progressing  Goal: No self harm this shift  Outcome: Progressing  Goal: Read Safety Guidelines this shift  Outcome: Progressing  Goal: Complete Mental Health Safety Plan (psychiatry only) this shift  Outcome: Progressing   The patient's goals for the shift include      The clinical goals for the shift include Patient will have controlled pain throughout shift    Over the shift, the patient did not make progress toward the following goals. Barriers to progression include . Recommendations to address these barriers include .

## 2025-02-18 NOTE — CARE PLAN
The patient's goals for the shift include      The clinical goals for the shift include Patient will have controlled pain throughout shift    Over the shift, the patient did not make progress toward the following goals. Barriers to progression include . Recommendations to address these barriers include .

## 2025-02-18 NOTE — PROGRESS NOTES
Ino Pierce is a 60 y.o. male on day 5 of admission presenting with Pneumonia of both lungs due to infectious organism, unspecified part of lung.      Subjective   Ino Pierce is a 60 y.o. male with PMHx s/f stage IV severe COPD, chronic hypoxic hypercapnic respiratory failure on 5 L nasal cannula, cigarette dependence, CAD, HTN, HLD, HFrEF, seizure in 2016, ADV presenting with worsening shortness of breath.  Has associated headache, productive cough and generalized malaise for past few days.  He states that he is following with palliative care at [facility] for his COPD but was kicked out of the program because he could not make it to the appointments.  He notes he has been having difficulty this year and is emotional exhausted as there were four family members and two pets passed away, all in this year. He got kicked out of his house and is currently living with his nephew. He has no active suicide ideation nor plan of suicide. He is not taking any medications, has been off of his inhalers for a while. Does not know what medications he was on.  Denies lightheadedness, fever chills nausea vomiting, chest pain, leg swelling.  He states almost everyone is sick at his place right now.      ED Course (Summary - please note all labs, imaging studies, and interventions noted below have been personally reviewed and/or interpreted on day of admission):   Vitals on presentation: 98.3 F, 105 bpm, 20 RR, 144/91, 90% on 5 L nasal cannula  Labs: CMP, CBC, VBG largely unremarkable  Viral studies negative  EKG: Sinus tachycardia at 101 bpm, no acute STEMI  Imaging: CXR-new 1.2 cm pulmonary nodule concerning for potential neoplasm  CT chest AP-mild new mediastinal adenopathy and bilateral hilar adenopathy.  Mild secretion in trachea and main bronchi, bronchial wall thickening in both lower lobes consistent with acute bronchitis.  Areas of reticular nodule and infiltrate density in lower lobes consistent with Imodium.  Scattered  new area of tiny nodule and reticular nodular density in right upper and middle lobe consistent with acute bronchiolitis.  Underlying emphysema.  Aortoiliac calcifications.  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  Small right lobe liver cyst.  Previous appendectomy.  Interventions: Zithromax, Augmentin, morphine 4 mg x 2, prednisone 60 mg, admission for further management  2/13: Patient was seen and examined.  Saturating well on 6 5 L nasal cannula oxygen which is his baseline.  Continues to complain of shortness of breath.  Was also complaining of pain.  Urine Legionella and strep antigen are negative.  Will continue current antibiotics breathing treatment and steroids.  Palliative care consulted.  2/14: Patient was seen and examined.  Saturating well on 5 L nasal cannula oxygen.  Patient is admitting to home with home hospice.  Palliative care and hospice to see later today.  Psychiatry consulted will get to see.  Patient is medically stable for inpatient psychiatric evaluation/placement.  2/15: Patient feeling better seen by hospice working on discharge planning Home with home hospice.  2/16: Trying to work out with case management social worker discharge with hospice.  Family states he cannot come home at this point because they are so sick at home.  Did talk to  about respite so that we do not have to wait as long for authorization.  Clinically stable for discharge with hospice but health is very tenuous..  Will add IV steroids.  Will finish up with the oral taper on discharge.  2/17: Patient reports shortness of breath upon waking this am but states it has improved after receiving morning meds. Currently on 5L NC. Reports increased lower back pain and requesting more pain medication. Clinically stable for discharge with hospice. Patient is currently unable to return home due to multiple sick family members in home. SW following case.   Addendum: Will add Soma and Norco for back pain.  Working on  discharge planning.  2/18: Patient reports improved shortness of breath and back pain today. Patient clinically stable for discharge today.     Patient doing well okay to discharge with home hospice.    See after visit summary for complete plan    35 minutes spent in the care of this patient.      Objective     Last Recorded Vitals  /66 (BP Location: Right arm, Patient Position: Lying)   Pulse 68   Temp 36.3 °C (97.3 °F) (Temporal)   Resp 17   Wt 52.3 kg (115 lb 4.8 oz)   SpO2 99%   Intake/Output last 3 Shifts:    Intake/Output Summary (Last 24 hours) at 2/18/2025 0859  Last data filed at 2/18/2025 0003  Gross per 24 hour   Intake 1010 ml   Output 850 ml   Net 160 ml       Admission Weight  Weight: 57.6 kg (127 lb) (02/12/25 0832)    Daily Weight  02/13/25 : 52.3 kg (115 lb 4.8 oz)    Image Results  CT chest abdomen pelvis w IV contrast  Narrative: Interpreted By:  Tyshawn Barahona,   STUDY:  CT CHEST ABDOMEN PELVIS W IV CONTRAST;  2/12/2025 10:19 am      INDICATION:  59 y/o   M with  Signs/Symptoms:left abd pain, sob.          LIMITATIONS:  None..      ACCESSION NUMBER(S):  [id number]      ORDERING CLINICIAN:  ELIZABETH ARMENDARIZ      TECHNIQUE:  After the administration of IV nonionic contrast, images were  obtained from the thoracic inlet down through the symphysis pubis.  Sagittal and coronal reconstruction images were generated.  Mediastinal, lung, bone, and liver windows were reviewed. Omnipaque  350   75 ML      COMPARISON:  CT chest with contrast dated 03/18/2022.      FINDINGS:  CHEST FINDINGS:      CHEST WALL/BASE OF THE NECK:  The chest wall was grossly intact.  No thyromegaly or thyroid mass.      LUNGS/ PLEURA/ AND TRACHEA:  No pleural effusion. No pneumothorax.  Mild secretions along the posterior wall of the distal trachea..  There are very mild secretions along the posterior walls of each main  bronchus. There is bilateral bronchial wall thickening, most  pronounced in the right middle lobe and left  lingula and the lower  lobes. There are some secretions in several segmental and  subsegmental bronchi in the lower lobes, right greater than left.  There are infiltrative densities and also reticulonodular densities  all all posteriorly in both lower lobes consistent with pneumonia,  right greater than left. There are emphysematous changes in the  lungs. There are several scattered small nodules peripherally in the  lateral and posterior right upper lobe, for example on axial image  98. These were not present previously. There are multiple tiny  nodules laterally in the inferior aspect of the right upper lobe  centered on axial image 184, not evident previously. There are also  multiple tiny peripheral nodules laterally in the right middle lobe,  for example on image 223, not present previously. There are multiple  new tiny nodules and reticulonodular densities in the superior  segment of the right lower lobe. There are new tiny nodules and  reticulonodular densities in the left lingula.      MEDIASTINUM/ARMIDA:  Development of mild mediastinal adenopathy with lymph nodes measuring  10 mm short axis or less. Very mild bilateral hilar adenopathy. No  axillary adenopathy. No cardiomegaly.  No pericardial effusion.  There was no aneurysmal dilatation or dissection of the thoracic  aorta.      BONES:  No destructive lytic or blastic bone lesion.          ABDOMEN/PELVIS FINDINGS:      LIVER:  No hepatomegaly.  Liver density was  within the limits of normal.  There is a 6 mm cyst in segment 6 of the liver on image 122. Normal  portal vein enhancement..      GALLBLADDER:  No calcified stone, gallbladder wall thickening, or adjacent edema.      BILE DUCTS:  No intrahepatic biliary ductal dilatation.  Common bile duct was within the limits of normal.      SPLEEN:  No splenomegaly.  No splenic  mass.      PANCREAS:  No pancreatic mass or inflammation, or ductal dilatation.      KIDNEYS/ADRENALS:  No adrenal mass or  enlargement.  No calcified stone, hydronephrosis, mass, or perinephric edema in  either kidney. No ureteral stone or dilatation.      BLADDER/PELVIS:  Urinary bladder was grossly intact.  No prostate enlargement.      GREAT VESSELS/RETROPERITONEUM:  There is severe stenosis of the proximal to mid SMA caused by mural  calcification and posterior wall mural thrombus. Moderate aortoiliac  calcifications. Otherwise, abdominal aorta and IVC were grossly  intact. No suspicious retroperitoneal adenopathy.  No suspicious mesenteric adenopathy.  No suspicious pelvic or inguinal adenopathy.      PERITONEUM:  No ascites.  No pneumoperitoneum.  No peritoneal or mesenteric mass or inflammation.      BOWEL:  The stomach was  grossly intact.  There was no small bowel dilatation or small bowel wall thickening.  No small-bowel obstruction. Mild scattered retained colonic stool and  gas. There was no colonic wall thickening or large bowel obstruction.  No edema adjacent to the colon. The cecal appendix was surgically  absent..      BONES:  No destructive lytic or blastic bone lesion. Bilateral L4 pars  defects. Grade 1 anterolisthesis of L4 over L5. Mild-to-moderate disc  space narrowing with endplate spurring at that level. Inter facet  hypertrophic arthritic changes at L5-S1 with mild disc space  narrowing at that level. There is mild endplate osteophytosis at L1-2  and L2-3. Slight disc space narrowing at L1-2. Incidental note of  partial sacralization of the left L5 transverse process. Mild  sclerotic arthritic changes in both SI joints.      ABDOMINAL WALL:  Unremarkable.      Impression: CHEST:  Mild new mediastinal adenopathy and very mild bilateral hilar  adenopathy, most likely infectious/inflammatory.      Mild secretions in the trachea and main bronchi as described, in  addition to secretions evident in several segmental/subsegmental  bronchi in the lower lobes. In addition, there is bronchial wall  thickening in both  lower lobes and to a lesser extent the right  middle lobe and left lingula consistent with acute bronchitis.  Finally, there are areas of reticulonodular and more confluent  infiltrative density in the lower lobes as described, right greater  than left, consistent with pneumonia. There are also scattered new  areas of tiny nodules and reticulonodular densities in the right  upper lobe and right middle lobe, superior segment of the right lower  lobe, and in the left lingula consistent with acute bronchiolitis.      Underlying emphysema.          ABDOMEN/PELVIS:  Arthritic changes in the lumbosacral spine as described. No CT  evidence of fracture of the lumbar spine or pelvis in this exam.      Aortoiliac calcifications. Severe stenosis of the proximal to mid SMA  as described. Please see above for details.      Small right lobe liver cyst.      Previous appendectomy.      MACRO:  None      Signed by: Tyshawn Barahona 2/12/2025 10:46 AM  Dictation workstation:   [id number]  Electrocardiogram, 12-lead  Sinus tachycardia  Biatrial enlargement  ST elevation, consider inferior injury    See ED provider note for full interpretation and clinical correlation  Confirmed by Sandy Valles ([id number]) on 2/12/2025 9:56:47 AM  XR chest 1 view  Narrative: Interpreted By:  Yan Almaraz,   STUDY:  XR CHEST 1 VIEW      INDICATION:  Signs/Symptoms:sob.      COMPARISON:  March 18, 2022      ACCESSION NUMBER(S):  [id number]      ORDERING CLINICIAN:  ELIZABETH ARMENDARIZ      FINDINGS:  1.2 cm suspected right lower lobe pulmonary nodule new from prior  study. This raises concern for possible neoplasm.      COPD changes have worsened in the interval with more flattening of  the diaphragms and more coarse interstitial markings.      No consolidation, effusion, edema, pneumothorax.      Impression: New 1.2 cm pulmonary nodule raising concern for potential neoplasm.  CT of the thorax recommended for further evaluation.      Worsening COPD.       MACRO:  Critical Finding:  See findings. Notification was initiated on  2/12/2025 at 9:45 am by  Yan Almaraz.  (**-YCF-**) Instructions:      Signed by: Yan Almaraz 2/12/2025 9:47 AM  Dictation workstation:   [id number]      Physical Exam  Vitals:    02/18/25 0846   BP:    Pulse:    Resp:    Temp:    SpO2: 99%     Constitutional: Pleasant and cooperative. Laying in bed in no acute distress. Conversant.   Skin: Warm and dry; no obvious lesions, rashes, pallor, or jaundice.   Eyes: EOMI. Anicteric sclera.   ENT: Mucous membranes moist; no obvious injury or deformity appreciated.   Head and Neck: Normocephalic, atraumatic. ROM preserved. Trachea midline. No appreciable JVD.   Respiratory: +Wheezes bilaterally, currently on 5L NC   Cardiovascular: regular heart rate and karime. No gross murmur, gallop, or rub. Extremities are warm and well-perfused with good capillary refill (< 3 seconds). No chest wall tenderness.   GI: Abdomen soft, tender with minimal palpation diffuse throughout, nondistended. No obvious organomegaly appreciated. Bowel sounds are present.  MSK: No gross abnormalities appreciated.  Decreased range of motion of the trunk because of low back pain.  Extremities: No cyanosis, edema, or clubbing evident. Neurovascularly intact.   Neuro: A&Ox3. CN 2-12 grossly intact. Able to respond to questions appropriately and clearly. No acute focal neurologic deficits appreciated.  Psych: Appropriate mood and affect.   Relevant Results             Scheduled medications  aspirin, 81 mg, oral, Daily  atorvastatin, 80 mg, oral, Daily  azithromycin, 500 mg, oral, Daily  bisacodyl, 10 mg, oral, Once  cefTRIAXone, 2 g, intravenous, q24h  dexAMETHasone, 4 mg, intravenous, q12h  escitalopram, 20 mg, oral, Daily  tiotropium, 2 puff, inhalation, Daily   And  fluticasone furoate-vilanteroL, 1 puff, inhalation, Daily  guaiFENesin, 1,200 mg, oral, BID  ipratropium-albuteroL, 3 mL, nebulization, 4x daily  magnesium hydroxide,  45 mL, oral, Once  metoprolol succinate XL, 50 mg, oral, Daily  pantoprazole, 40 mg, oral, Daily before breakfast   Or  pantoprazole, 40 mg, intravenous, Daily before breakfast  sennosides, 2 tablet, oral, BID      Continuous medications     PRN medications  PRN medications: acetaminophen, carisoprodol, ipratropium-albuteroL, LORazepam, melatonin, morphine, ondansetron, oxyCODONE-acetaminophen, polyethylene glycol    Assessment/Plan        Pneumonia community-acquired  Stage IV severe COPD  Chronic hypoxic and hypercapnic respiratory failure  Multiple new pulmonary nodules on CAT scan  Severe 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  Discharge home with home hospice.  Breathing much better

## 2025-02-18 NOTE — DISCHARGE SUMMARY
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   cefdinir 300 mg capsule; Commonly known as: Omnicef; Take 1 capsule (300   mg) by mouth 2 times a day for 5 days.   doxycycline 100 mg capsule; Commonly known as: Vibramycin; Take 1   capsule (100 mg) by mouth 2 times a day for 5 days. Take with at least 8   ounces (large glass) of water, do not lie down for 30 minutes after   oxyCODONE-acetaminophen 5-325 mg tablet; Commonly known as: Percocet;   Take 1.5 tablets by mouth every 6 hours if needed for moderate pain (4 -   6) or severe pain (7 - 10).   predniSONE 10 mg tablet; Commonly known as: Deltasone; Take 4 tablets   (40 mg) by mouth once daily for 2 days, THEN 3 tablets (30 mg) once daily   for 2 days, THEN 2 tablets (20 mg) once daily for 2 days, THEN 1 tablet   (10 mg) once daily for 2 days.; Start taking on: February 18, 2025     CONTINUE taking these medications     albuterol 90 mcg/actuation inhaler   Breztri Aerosphere 160-9-4.8 mcg/actuation HFA aerosol inhaler; Generic   drug: budesonide-glycopyr-formoterol   escitalopram 20 mg tablet; Commonly known as: Lexapro   roflumilast 500 mcg tablet; Commonly known as: Daliresp   Symbicort 160-4.5 mcg/actuation inhaler; Generic drug:   budesonide-formoteroL      ASK your doctor about these medications     LORazepam 0.5 mg tablet; Commonly known as: Ativan   morphine 15 mg tablet; Commonly known as: MSIR   sennosides-docusate sodium 8.6-50 mg tablet; Commonly known as:   Lyssa-Colace       Test Results Pending At Discharge  Pending Labs       No current pending labs.            Hospital Course   Urine Legionella and strep antigen are negative.  Will continue current antibiotics breathing treatment and steroids.  Palliative care consulted.  2/14: Patient was seen and examined.  Saturating well on 5 L nasal cannula oxygen.  Patient is admitting to home with home hospice.  Palliative care and hospice to see later today.  Psychiatry consulted will get to see.  Patient is medically stable for inpatient psychiatric evaluation/placement.  2/15: Patient feeling better seen by hospice working on discharge planning Home with home hospice.  2/16: Trying to work out with case management social worker discharge with hospice.  Family states he cannot come home at this point because they are so sick at home.  Did talk to  about respite so that we do not have to wait as long for authorization.  Clinically stable for discharge with hospice but health is very tenuous..  Will add IV steroids.  Will finish up with the oral taper on discharge.  2/17: Patient reports shortness of breath upon waking this am but states it has improved after receiving morning meds. Currently on 5L NC. Reports increased lower back pain and requesting more pain medication. Clinically stable for discharge with hospice. Patient is currently unable to return home due to multiple sick family members in home. SW following case.   Addendum: Will add Soma and Norco for back pain.  Working on discharge planning.  2/18: Patient reports improved shortness of breath and back pain today. Patient clinically stable for discharge today.      Patient doing well okay to discharge with home hospice.     See after visit  summary for complete plan     35 minutes spent in the care of this patient.          Pertinent Physical Exam At Time of Discharge  Physical Exam  See today's progress note for more physical exam findings breathing comfortably on nasal cannula oxygen.  Outpatient Follow-Up  No future appointments.      Mingo Avila MD   Passed

## 2025-02-18 NOTE — PROGRESS NOTES
"Nutrition Follow Up Assessment:   Nutrition Assessment         Medical history per chart:    stage IV severe COPD, chronic hypoxic hypercapnic respiratory failure on 5 L nasal cannula, cigarette dependence, CAD, HTN, HLD, HFrEF, seizure in 2016, DAV      Admitted for Admission for hospice care, Pneumonia of both lungs due to infectious organism      2/18:  Pt reports good PO intake, and appetite.  Pt likes supplement drinks to help promote weight gain.  Noted possible discharge today.     2/13: Pt reported good appetite today, no GI complaints. Weight history as below. Pt receptive to Ensure Plus High Protein drink BID, will send. Pt admission Dx, PMH, labs, medications, allergies, diet orders, skin integrity reviewed. RD to follow per POC, protocol.     Nutrition History:  Energy Intake: Fair 50-75 %  Food and Nutrient History: Pt reported fair appetite/PO intake with N/V x1 day PTA.  Vitamin/Herbal Supplement Use: None per pt.  Pattern of Alcohol Consumption: No history on file for alcohol use per H&P.       Average meal Intake during admission: %   Dietary Orders (From admission, onward)       Start     Ordered    02/13/25 1037  Oral nutritional supplements  Until discontinued        Question Answer Comment   Deliver with Breakfast    Deliver with Dinner    Select supplement: Ensure Plus High Protein        02/13/25 1036    02/13/25 0049  May Participate in Room Service  ( ROOM SERVICE MAY PARTICIPATE)  Once        Question:  .  Answer:  Yes    02/13/25 0048    02/12/25 2324  Adult diet Regular  Diet effective now        Question:  Diet type  Answer:  Regular    02/12/25 2324                    Anthropometrics:  Height: 167.6 cm (5' 6\")   Weight: 52.3 kg (115 lb 4.8 oz)   BMI (Calculated): 18.62  IBW/kg (Dietitian Calculated): 64.5 kg        Weight History:   Wt Readings from Last 10 Encounters:   02/13/25 52.3 kg (115 lb 4.8 oz)       Weight Change %:  Weight History / % Weight Change: Weight loss " indicated on MST. Pt reported UBW around 180 pounds, believes he last weighed 180 pounds about a year ago. Chart review records show weight of 66.3kg (1/9/24), a 13% loss x 6 months when compared to 59.9kg (7/11/24).  Significant Weight Loss: Yes  Interpretation of Weight Loss: >10% in 6 months    Nutrition Focused Physical Exam Findings:  Subcutaneous Fat Loss:   Orbital Fat Pads: Mild-Moderate (slight dark circles and slight hollowing) (Exam limited d/t room light off)  Buccal Fat Pads: Mild-Moderate (flat cheeks, minimal bounce)  Muscle Wasting:  Temporalis: Mild-Moderate (slight depression)  Pectoralis (Clavicular Region): Mild-Moderate (some protrusion of clavicle)  Interosseous: Mild-Moderate (slightly depressed area between thumb and forefinger)  Edema:     Physical Findings:  Skin: Negative    Nutrition Significant Labs:    Results from last 7 days   Lab Units 02/16/25  0411 02/15/25  0516 02/14/25  0342   GLUCOSE mg/dL 84 87 95   SODIUM mmol/L 139 135* 138   POTASSIUM mmol/L 4.2 3.9 3.7   CHLORIDE mmol/L 101 101 100   CO2 mmol/L 31 30 30   BUN mg/dL 17 14 22   CREATININE mg/dL 0.70 0.63 0.85   EGFR mL/min/1.73m*2 >90 >90 >90   CALCIUM mg/dL 7.7* 8.2* 8.2*     Lab Results   Component Value Date    HGBA1C 5 12/05/2023           Nutrition Specific Medications:  Meds reviewed/noted.   aspirin, 81 mg, oral, Daily  atorvastatin, 80 mg, oral, Daily  azithromycin, 500 mg, oral, Daily  bisacodyl, 10 mg, oral, Once  cefTRIAXone, 2 g, intravenous, q24h  dexAMETHasone, 4 mg, intravenous, q12h  escitalopram, 20 mg, oral, Daily  tiotropium, 2 puff, inhalation, Daily   And  fluticasone furoate-vilanteroL, 1 puff, inhalation, Daily  guaiFENesin, 1,200 mg, oral, BID  ipratropium-albuteroL, 3 mL, nebulization, 4x daily  magnesium hydroxide, 45 mL, oral, Once  metoprolol succinate XL, 50 mg, oral, Daily  pantoprazole, 40 mg, oral, Daily before breakfast   Or  pantoprazole, 40 mg, intravenous, Daily before breakfast              I/O:    ; Stool Appearance: Soft, Loose (02/13/25 0311)    Estimated Needs:   Total Energy Estimated Needs in 24 hours (kCal):  (7607-8997)  Method for Estimating Needs: 30-35kcal/kg actual body weight  Total Protein Estimated Needs in 24 Hours (g):  ()  Method for Estimating 24 Hour Protein Needs: 1.5-2g/kg actual body weight  Total Fluid Estimated Needs in 24 Hours (mL):  (9418-3776)  Method for Estimating 24 Hour Fluid Needs: 1mL/kcal        Nutrition Diagnosis   Malnutrition Diagnosis  Patient has Malnutrition Diagnosis: Yes  Diagnosis Status: Active  Malnutrition Diagnosis: Severe malnutrition related to chronic disease or condition  Related to: COPD metabolism  As Evidenced by: moderate subcutaneous fat, muscle loss per NFPE, >10% weight loss x 6 months            Nutrition Interventions/Recommendations   Nutrition prescription for oral nutrition    Nutrition Recommendations:  Individualized Nutrition Prescription Provided for : Supplements    Nutrition Interventions/Goals:   Interventions: Medical food supplement  Medical Food Supplement: Commercial beverage medical food supplement therapy  Goal: Ensure plus high protein BID  Coordination of Care with Providers: Nursing  Goal: RN      Education Documentation  Nutrition Related Education, taught by Aleida Hernandez RD at 2/18/2025  1:49 PM.  Learner: Patient  Readiness: Acceptance  Method: Explanation  Response: Verbalizes Understanding  Comment: Reviewed supplements              Nutrition Monitoring and Evaluation   Food/Nutrient Related History Monitoring  Monitoring and Evaluation Plan: Estimated Energy Intake  Estimated Energy Intake: Energy intake greater or equal to 75% of estimated energy needs    Anthropometric Measurements  Monitoring and Evaluation Plan: Body weight  Body Weight: Body weight - Maintain stable weight    Biochemical Data, Medical Tests and Procedures  Monitoring and Evaluation Plan: Electrolyte/renal panel, Glucose/endocrine  profile  Electrolyte and Renal Panel: Electrolytes within normal limits  Glucose/Endocrine Profile: Glucose within normal limits ( mg/dL)    Physical Exam Findings  Monitoring and Evaluation Plan: Skin  Skin Finding: Promote intact skin - Promote skin integrity  Other: Intact skin    Goal Status: Goal(s) achieved    Time Spent (min): 35 minutes

## 2025-02-18 NOTE — PROGRESS NOTES
SHERRI communicated with [facility] RN Gabriela, hospice had equipment delivered to pt's home last night. RN coming to see pt today, arranging transport for pt to return home. SW notified Dr. Avila via secure chat, no other needs identified at this time, SW available should additional needs arise.    Ulises Whiting, MSW, LSW ([id number])   Care Transitions

## 2025-02-18 NOTE — PROGRESS NOTES
Physical Therapy                 Therapy Communication Note    Patient Name: Ino Pierce  MRN: [id number]  Department: [address]  Room: [facility]/2327-  Today's Date: 2/18/2025     Discipline: Physical Therapy    PT Missed Visit: Yes     Missed Visit Reason: Missed Visit Reason:  (pt declined stating he is going home in the next hour)    Missed Time: Attempt    Comment:

## 2025-02-18 NOTE — PROGRESS NOTES
Palliative Care Follow-Up Progress Note    Ino Pierce is a 60 y.o. male on day 5 of admission presenting with weakness, fall, shortness of breath, cough and passive SI. 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 He has had 2 falls in the last couple of months, independent in ADL's, sponge bathes, uses a walker at times, independent in finances and medication management. Family do household chores including cooking, cleaning, laundry and also transport patient where he needs to go as he has not driven in the last 1.5 years. This is his first hospitalization since December 2023 2/17/2025: Patient seen and examined, no visitors at the bedside. Patient hoping he can be discharged today and reports he does not need a nursing home as his family members are no longer sick. He otherwise denies complaints.     2/18/2025: Patient seen and examined, no visitors at the bedside. Patient eager to discharge home with hospice. Reports pain medication changes yesterday were effective and that he is  having regular bowel movements and requesting senna to be discontinued. No further questions or concerns.     Review of Systems   Respiratory:  Positive for shortness of breath (MEDRANO).    Gastrointestinal:  Negative for constipation.   Musculoskeletal:  Positive for back pain.   Neurological:  Positive for weakness.   Psychiatric/Behavioral:  Negative for confusion.        Physical Exam  HENT:      Head: Normocephalic.      Nose: Nose normal.      Mouth/Throat:      Mouth: Mucous membranes are moist.    Eyes:      General: No scleral icterus.  Cardiovascular:      Rate and Rhythm: Normal rate.   Pulmonary:      Comments: Oxygen via NC  Musculoskeletal:      Cervical back: Neck supple.   Neurological:      Mental Status: He is alert and oriented to person, place, and time.   Psychiatric:         Mood and Affect: Mood normal.       Plan     Expectations of treatment/Goals of care: home with hospice   Advanced directives/Advanced care planning: requested copies of HCPOA and living will    Health care power of  or Identified surrogate medical decision maker based on [place] hierarchy: ex-girlfriend Jacquelyn is reported HCPOA, patient states he does not want to update documents and comfortable with her being POA. Without document, patient's 4 adult children would be his medical decision makers if patient lost capacity to make his own medical decisions   Code status: DNR/DNI [place] DNR: completed [place] DNR CCA 2/14 and instructed patient to hang on the refrigerator once home, copy placed on hospital chart. Will likely transition to [facility] once enrolled into hospice   Outpatient services: [facility] consult placed 2/14 and has already met with patient    I spent 20 minutes in the professional and overall care of this patient.    Palliative care will sign off, please notify for further questions or concerns.     Hussain Bailey, APRN-CNP